# Patient Record
Sex: FEMALE | Race: WHITE | NOT HISPANIC OR LATINO
[De-identification: names, ages, dates, MRNs, and addresses within clinical notes are randomized per-mention and may not be internally consistent; named-entity substitution may affect disease eponyms.]

---

## 2017-04-25 ENCOUNTER — RESULT REVIEW (OUTPATIENT)
Age: 52
End: 2017-04-25

## 2017-06-07 ENCOUNTER — RESULT REVIEW (OUTPATIENT)
Age: 52
End: 2017-06-07

## 2017-08-31 ENCOUNTER — APPOINTMENT (OUTPATIENT)
Dept: MAMMOGRAPHY | Facility: CLINIC | Age: 52
End: 2017-08-31

## 2017-08-31 ENCOUNTER — OUTPATIENT (OUTPATIENT)
Dept: OUTPATIENT SERVICES | Facility: HOSPITAL | Age: 52
LOS: 1 days | End: 2017-08-31
Payer: COMMERCIAL

## 2017-08-31 ENCOUNTER — APPOINTMENT (OUTPATIENT)
Dept: ULTRASOUND IMAGING | Facility: CLINIC | Age: 52
End: 2017-08-31

## 2017-08-31 DIAGNOSIS — Z00.8 ENCOUNTER FOR OTHER GENERAL EXAMINATION: ICD-10-CM

## 2017-08-31 PROCEDURE — 77063 BREAST TOMOSYNTHESIS BI: CPT | Mod: 26

## 2017-08-31 PROCEDURE — 77067 SCR MAMMO BI INCL CAD: CPT

## 2017-08-31 PROCEDURE — 77063 BREAST TOMOSYNTHESIS BI: CPT

## 2017-08-31 PROCEDURE — 76641 ULTRASOUND BREAST COMPLETE: CPT

## 2017-08-31 PROCEDURE — 76641 ULTRASOUND BREAST COMPLETE: CPT | Mod: 26,50

## 2017-08-31 PROCEDURE — G0202: CPT | Mod: 26

## 2017-09-05 ENCOUNTER — OUTPATIENT (OUTPATIENT)
Dept: OUTPATIENT SERVICES | Facility: HOSPITAL | Age: 52
LOS: 1 days | End: 2017-09-05
Payer: COMMERCIAL

## 2017-09-05 ENCOUNTER — APPOINTMENT (OUTPATIENT)
Dept: ULTRASOUND IMAGING | Facility: CLINIC | Age: 52
End: 2017-09-05

## 2017-09-05 DIAGNOSIS — Z00.8 ENCOUNTER FOR OTHER GENERAL EXAMINATION: ICD-10-CM

## 2017-09-05 PROCEDURE — 76642 ULTRASOUND BREAST LIMITED: CPT

## 2017-09-05 PROCEDURE — 76642 ULTRASOUND BREAST LIMITED: CPT | Mod: 26,RT

## 2017-09-07 ENCOUNTER — OUTPATIENT (OUTPATIENT)
Dept: OUTPATIENT SERVICES | Facility: HOSPITAL | Age: 52
LOS: 1 days | End: 2017-09-07
Payer: COMMERCIAL

## 2017-09-07 ENCOUNTER — RESULT REVIEW (OUTPATIENT)
Age: 52
End: 2017-09-07

## 2017-09-07 ENCOUNTER — APPOINTMENT (OUTPATIENT)
Dept: ULTRASOUND IMAGING | Facility: CLINIC | Age: 52
End: 2017-09-07

## 2017-09-07 DIAGNOSIS — Z00.8 ENCOUNTER FOR OTHER GENERAL EXAMINATION: ICD-10-CM

## 2017-09-07 PROCEDURE — 77065 DX MAMMO INCL CAD UNI: CPT

## 2017-09-07 PROCEDURE — 19083 BX BREAST 1ST LESION US IMAG: CPT | Mod: RT

## 2017-09-07 PROCEDURE — G0206: CPT | Mod: 26,RT

## 2017-09-07 PROCEDURE — 19083 BX BREAST 1ST LESION US IMAG: CPT

## 2017-09-11 DIAGNOSIS — N63 UNSPECIFIED LUMP IN BREAST: ICD-10-CM

## 2017-09-11 DIAGNOSIS — R92.8 OTHER ABNORMAL AND INCONCLUSIVE FINDINGS ON DIAGNOSTIC IMAGING OF BREAST: ICD-10-CM

## 2017-10-04 ENCOUNTER — OUTPATIENT (OUTPATIENT)
Dept: OUTPATIENT SERVICES | Facility: HOSPITAL | Age: 52
LOS: 1 days | End: 2017-10-04
Payer: COMMERCIAL

## 2017-10-04 ENCOUNTER — APPOINTMENT (OUTPATIENT)
Dept: ULTRASOUND IMAGING | Facility: IMAGING CENTER | Age: 52
End: 2017-10-04
Payer: COMMERCIAL

## 2017-10-04 DIAGNOSIS — N60.81 OTHER BENIGN MAMMARY DYSPLASIAS OF RIGHT BREAST: ICD-10-CM

## 2017-10-05 PROCEDURE — 19285 PERQ DEV BREAST 1ST US IMAG: CPT

## 2017-10-05 PROCEDURE — 19285 PERQ DEV BREAST 1ST US IMAG: CPT | Mod: RT

## 2017-10-05 PROCEDURE — C1739: CPT

## 2017-10-10 ENCOUNTER — OUTPATIENT (OUTPATIENT)
Dept: OUTPATIENT SERVICES | Facility: HOSPITAL | Age: 52
LOS: 1 days | End: 2017-10-10
Payer: COMMERCIAL

## 2017-10-10 VITALS
RESPIRATION RATE: 18 BRPM | DIASTOLIC BLOOD PRESSURE: 80 MMHG | OXYGEN SATURATION: 98 % | WEIGHT: 151.9 LBS | HEIGHT: 63 IN | HEART RATE: 71 BPM | SYSTOLIC BLOOD PRESSURE: 130 MMHG | TEMPERATURE: 98 F

## 2017-10-10 DIAGNOSIS — Z01.818 ENCOUNTER FOR OTHER PREPROCEDURAL EXAMINATION: ICD-10-CM

## 2017-10-10 DIAGNOSIS — N63.0 UNSPECIFIED LUMP IN UNSPECIFIED BREAST: ICD-10-CM

## 2017-10-10 DIAGNOSIS — N60.81 OTHER BENIGN MAMMARY DYSPLASIAS OF RIGHT BREAST: ICD-10-CM

## 2017-10-10 DIAGNOSIS — E07.9 DISORDER OF THYROID, UNSPECIFIED: Chronic | ICD-10-CM

## 2017-10-10 DIAGNOSIS — D36.10 BENIGN NEOPLASM OF PERIPHERAL NERVES AND AUTONOMIC NERVOUS SYSTEM, UNSPECIFIED: Chronic | ICD-10-CM

## 2017-10-10 DIAGNOSIS — K21.9 GASTRO-ESOPHAGEAL REFLUX DISEASE WITHOUT ESOPHAGITIS: ICD-10-CM

## 2017-10-10 LAB
HCT VFR BLD CALC: 38.6 % — SIGNIFICANT CHANGE UP (ref 34.5–45)
HGB BLD-MCNC: 12.6 G/DL — SIGNIFICANT CHANGE UP (ref 11.5–15.5)
MCHC RBC-ENTMCNC: 30.2 PG — SIGNIFICANT CHANGE UP (ref 27–34)
MCHC RBC-ENTMCNC: 32.6 GM/DL — SIGNIFICANT CHANGE UP (ref 32–36)
MCV RBC AUTO: 92.6 FL — SIGNIFICANT CHANGE UP (ref 80–100)
PLATELET # BLD AUTO: 264 K/UL — SIGNIFICANT CHANGE UP (ref 150–400)
RBC # BLD: 4.17 M/UL — SIGNIFICANT CHANGE UP (ref 3.8–5.2)
RBC # FLD: 13 % — SIGNIFICANT CHANGE UP (ref 10.3–14.5)
WBC # BLD: 9.11 K/UL — SIGNIFICANT CHANGE UP (ref 3.8–10.5)
WBC # FLD AUTO: 9.11 K/UL — SIGNIFICANT CHANGE UP (ref 3.8–10.5)

## 2017-10-10 PROCEDURE — G0463: CPT

## 2017-10-10 PROCEDURE — 85027 COMPLETE CBC AUTOMATED: CPT

## 2017-10-10 RX ORDER — LIDOCAINE HCL 20 MG/ML
0.2 VIAL (ML) INJECTION ONCE
Qty: 0 | Refills: 0 | Status: DISCONTINUED | OUTPATIENT
Start: 2017-10-11 | End: 2017-10-26

## 2017-10-10 RX ORDER — ACETAMINOPHEN 500 MG
975 TABLET ORAL ONCE
Qty: 0 | Refills: 0 | Status: COMPLETED | OUTPATIENT
Start: 2017-10-11 | End: 2017-10-11

## 2017-10-10 RX ORDER — SODIUM CHLORIDE 9 MG/ML
3 INJECTION INTRAMUSCULAR; INTRAVENOUS; SUBCUTANEOUS EVERY 8 HOURS
Qty: 0 | Refills: 0 | Status: DISCONTINUED | OUTPATIENT
Start: 2017-10-11 | End: 2017-10-26

## 2017-10-10 NOTE — H&P PST ADULT - ATTENDING COMMENTS
The patient is a 52 year old female who recently underwent a right 11:00 ultrasound core biopsy with pathology revealing atypia.  She now presents for a right breast surgical excisional biopsy.

## 2017-10-10 NOTE — H&P PST ADULT - NSANTHOSAYNRD_GEN_A_CORE
No. MAYDSON screening performed.  STOP BANG Legend: 0-2 = LOW Risk; 3-4 = INTERMEDIATE Risk; 5-8 = HIGH Risk

## 2017-10-10 NOTE — H&P PST ADULT - HISTORY OF PRESENT ILLNESS
This is a 51 y/o female upon a routine mammogram revealed right breast mass, she presents today for surgery

## 2017-10-11 ENCOUNTER — RESULT REVIEW (OUTPATIENT)
Age: 52
End: 2017-10-11

## 2017-10-11 ENCOUNTER — OUTPATIENT (OUTPATIENT)
Dept: OUTPATIENT SERVICES | Facility: HOSPITAL | Age: 52
LOS: 1 days | End: 2017-10-11
Payer: COMMERCIAL

## 2017-10-11 ENCOUNTER — TRANSCRIPTION ENCOUNTER (OUTPATIENT)
Age: 52
End: 2017-10-11

## 2017-10-11 VITALS
SYSTOLIC BLOOD PRESSURE: 100 MMHG | RESPIRATION RATE: 16 BRPM | OXYGEN SATURATION: 100 % | DIASTOLIC BLOOD PRESSURE: 60 MMHG | HEART RATE: 59 BPM | TEMPERATURE: 98 F

## 2017-10-11 VITALS
RESPIRATION RATE: 16 BRPM | HEART RATE: 59 BPM | DIASTOLIC BLOOD PRESSURE: 66 MMHG | WEIGHT: 151.9 LBS | SYSTOLIC BLOOD PRESSURE: 95 MMHG | OXYGEN SATURATION: 98 % | TEMPERATURE: 98 F | HEIGHT: 63 IN

## 2017-10-11 DIAGNOSIS — D36.10 BENIGN NEOPLASM OF PERIPHERAL NERVES AND AUTONOMIC NERVOUS SYSTEM, UNSPECIFIED: Chronic | ICD-10-CM

## 2017-10-11 DIAGNOSIS — N60.81 OTHER BENIGN MAMMARY DYSPLASIAS OF RIGHT BREAST: ICD-10-CM

## 2017-10-11 DIAGNOSIS — E07.9 DISORDER OF THYROID, UNSPECIFIED: Chronic | ICD-10-CM

## 2017-10-11 PROCEDURE — 76098 X-RAY EXAM SURGICAL SPECIMEN: CPT | Mod: 26

## 2017-10-11 PROCEDURE — 88307 TISSUE EXAM BY PATHOLOGIST: CPT | Mod: 26

## 2017-10-11 PROCEDURE — 88360 TUMOR IMMUNOHISTOCHEM/MANUAL: CPT | Mod: 26

## 2017-10-11 PROCEDURE — 19125 EXCISION BREAST LESION: CPT | Mod: RT

## 2017-10-11 PROCEDURE — 88307 TISSUE EXAM BY PATHOLOGIST: CPT

## 2017-10-11 PROCEDURE — 76098 X-RAY EXAM SURGICAL SPECIMEN: CPT

## 2017-10-11 PROCEDURE — 88360 TUMOR IMMUNOHISTOCHEM/MANUAL: CPT

## 2017-10-11 RX ORDER — OXYCODONE HYDROCHLORIDE 5 MG/1
10 TABLET ORAL ONCE
Qty: 0 | Refills: 0 | Status: DISCONTINUED | OUTPATIENT
Start: 2017-10-11 | End: 2017-10-11

## 2017-10-11 RX ORDER — OXYCODONE HYDROCHLORIDE 5 MG/1
5 TABLET ORAL ONCE
Qty: 0 | Refills: 0 | Status: DISCONTINUED | OUTPATIENT
Start: 2017-10-11 | End: 2017-10-11

## 2017-10-11 RX ORDER — FAMOTIDINE 10 MG/ML
20 INJECTION INTRAVENOUS ONCE
Qty: 0 | Refills: 0 | Status: COMPLETED | OUTPATIENT
Start: 2017-10-11 | End: 2017-10-11

## 2017-10-11 RX ORDER — CELECOXIB 200 MG/1
200 CAPSULE ORAL ONCE
Qty: 0 | Refills: 0 | Status: DISCONTINUED | OUTPATIENT
Start: 2017-10-11 | End: 2017-10-26

## 2017-10-11 RX ORDER — CELECOXIB 200 MG/1
200 CAPSULE ORAL ONCE
Qty: 0 | Refills: 0 | Status: COMPLETED | OUTPATIENT
Start: 2017-10-11 | End: 2017-10-11

## 2017-10-11 RX ORDER — ONDANSETRON 8 MG/1
4 TABLET, FILM COATED ORAL ONCE
Qty: 0 | Refills: 0 | Status: DISCONTINUED | OUTPATIENT
Start: 2017-10-11 | End: 2017-10-26

## 2017-10-11 RX ORDER — SODIUM CHLORIDE 9 MG/ML
1000 INJECTION INTRAMUSCULAR; INTRAVENOUS; SUBCUTANEOUS
Qty: 0 | Refills: 0 | Status: DISCONTINUED | OUTPATIENT
Start: 2017-10-11 | End: 2017-10-26

## 2017-10-11 RX ADMIN — CELECOXIB 200 MILLIGRAM(S): 200 CAPSULE ORAL at 10:28

## 2017-10-11 RX ADMIN — Medication 975 MILLIGRAM(S): at 10:28

## 2017-10-11 RX ADMIN — FAMOTIDINE 20 MILLIGRAM(S): 10 INJECTION INTRAVENOUS at 11:14

## 2017-10-11 NOTE — ASU DISCHARGE PLAN (ADULT/PEDIATRIC). - FOLLOWUP APPOINTMENT CLINIC/PHYSICIAN
Palleschi, Susan M (MD), Surgery  [address] [place] [address]  [address]  [place] [address]  Phone: [phone]

## 2017-10-11 NOTE — PRE-ANESTHESIA EVALUATION ADULT - NSANTHOSAYNRD_GEN_A_CORE
No. MADYSON screening performed.  STOP BANG Legend: 0-2 = LOW Risk; 3-4 = INTERMEDIATE Risk; 5-8 = HIGH Risk

## 2017-10-11 NOTE — ASU DISCHARGE PLAN (ADULT/PEDIATRIC). - SPECIAL INSTRUCTIONS
Activity: Resume activities of daily living. You may shower tomorrow, just let the water run over the wound, no scrubbing. No immersion baths or swimming in pools or ocean until you see us in the office again. You may remove the clear dressing tomorrow, but please leave the steri-strips (small white bandaids) on. These will remain on and fall off by themselves in the next few weeks.  Diet: Resume your regular diet  Follow up: Please make an appointment with Dr. Palleschi in 1-2 weeks. Please find contact information on this page.

## 2017-10-11 NOTE — ASU DISCHARGE PLAN (ADULT/PEDIATRIC). - ASU FOLLOWUP
[facility] ([facility]): 911 or go to the nearest Emergency Room

## 2017-10-11 NOTE — ASU DISCHARGE PLAN (ADULT/PEDIATRIC). - NOTIFY
Numbness, tingling/Pain not relieved by Medications/Bleeding that does not stop/Persistent Nausea and Vomiting/Swelling that continues/Fever greater than 101

## 2017-10-11 NOTE — ASU DISCHARGE PLAN (ADULT/PEDIATRIC). - MEDICATION SUMMARY - MEDICATIONS TO TAKE
I will START or STAY ON the medications listed below when I get home from the hospital:    CoQ10 300 mg oral capsule  -- 1 cap(s) by mouth once a day  -- Indication: For continue taking as previously indicated    PriLOSEC 40 mg oral delayed release capsule  -- 1 cap(s) by mouth once a day  -- Indication: For continue taking as previously indicated    Centrum oral tablet, chewable  -- 1 tab(s) by mouth once a day  -- Indication: For continue taking as previously indicated    Vitamin B12 100 mcg oral tablet  -- 1 tab(s) by mouth once a day  -- Indication: For continue taking as previously indicated

## 2017-10-11 NOTE — BRIEF OPERATIVE NOTE - PROCEDURE
<<-----Click on this checkbox to enter Procedure Breast biopsy, right  10/11/2017  with mikel  localization  Active  IRENE

## 2017-10-13 LAB — SURGICAL PATHOLOGY STUDY: SIGNIFICANT CHANGE UP

## 2017-10-23 ENCOUNTER — OUTPATIENT (OUTPATIENT)
Dept: OUTPATIENT SERVICES | Facility: HOSPITAL | Age: 52
LOS: 1 days | End: 2017-10-23
Payer: COMMERCIAL

## 2017-10-23 ENCOUNTER — APPOINTMENT (OUTPATIENT)
Dept: MRI IMAGING | Facility: CLINIC | Age: 52
End: 2017-10-23

## 2017-10-23 DIAGNOSIS — D36.10 BENIGN NEOPLASM OF PERIPHERAL NERVES AND AUTONOMIC NERVOUS SYSTEM, UNSPECIFIED: Chronic | ICD-10-CM

## 2017-10-23 DIAGNOSIS — Z00.8 ENCOUNTER FOR OTHER GENERAL EXAMINATION: ICD-10-CM

## 2017-10-23 DIAGNOSIS — E07.9 DISORDER OF THYROID, UNSPECIFIED: Chronic | ICD-10-CM

## 2017-10-23 PROCEDURE — A9585: CPT

## 2017-10-23 PROCEDURE — 0159T: CPT | Mod: 26

## 2017-10-23 PROCEDURE — 77059 MRI BREAST BILATERAL: CPT | Mod: 26

## 2017-10-23 PROCEDURE — C8908: CPT

## 2017-10-23 PROCEDURE — C8937: CPT

## 2017-10-27 ENCOUNTER — RESULT REVIEW (OUTPATIENT)
Age: 52
End: 2017-10-27

## 2017-10-27 ENCOUNTER — OUTPATIENT (OUTPATIENT)
Dept: OUTPATIENT SERVICES | Facility: HOSPITAL | Age: 52
LOS: 1 days | End: 2017-10-27
Payer: COMMERCIAL

## 2017-10-27 ENCOUNTER — APPOINTMENT (OUTPATIENT)
Dept: ULTRASOUND IMAGING | Facility: CLINIC | Age: 52
End: 2017-10-27
Payer: COMMERCIAL

## 2017-10-27 ENCOUNTER — APPOINTMENT (OUTPATIENT)
Dept: MRI IMAGING | Facility: CLINIC | Age: 52
End: 2017-10-27
Payer: COMMERCIAL

## 2017-10-27 DIAGNOSIS — D36.10 BENIGN NEOPLASM OF PERIPHERAL NERVES AND AUTONOMIC NERVOUS SYSTEM, UNSPECIFIED: Chronic | ICD-10-CM

## 2017-10-27 DIAGNOSIS — D05.11 INTRADUCTAL CARCINOMA IN SITU OF RIGHT BREAST: ICD-10-CM

## 2017-10-27 DIAGNOSIS — E07.9 DISORDER OF THYROID, UNSPECIFIED: Chronic | ICD-10-CM

## 2017-10-27 PROCEDURE — A4648: CPT

## 2017-10-27 PROCEDURE — 19083 BX BREAST 1ST LESION US IMAG: CPT | Mod: RT

## 2017-10-27 PROCEDURE — 19083 BX BREAST 1ST LESION US IMAG: CPT

## 2017-10-27 PROCEDURE — A9585: CPT

## 2017-10-27 PROCEDURE — 19085 BX BREAST 1ST LESION MR IMAG: CPT | Mod: RT

## 2017-10-27 PROCEDURE — G0206: CPT | Mod: 26,RT

## 2017-10-27 PROCEDURE — 77065 DX MAMMO INCL CAD UNI: CPT

## 2017-10-27 PROCEDURE — 82565 ASSAY OF CREATININE: CPT

## 2017-10-27 PROCEDURE — 19085 BX BREAST 1ST LESION MR IMAG: CPT

## 2017-10-30 ENCOUNTER — APPOINTMENT (OUTPATIENT)
Dept: PLASTIC SURGERY | Facility: CLINIC | Age: 52
End: 2017-10-30
Payer: COMMERCIAL

## 2017-10-30 VITALS
HEART RATE: 60 BPM | HEIGHT: 63 IN | DIASTOLIC BLOOD PRESSURE: 65 MMHG | SYSTOLIC BLOOD PRESSURE: 97 MMHG | TEMPERATURE: 97.8 F | WEIGHT: 147 LBS | BODY MASS INDEX: 26.05 KG/M2

## 2017-10-30 VITALS
HEART RATE: 60 BPM | RESPIRATION RATE: 16 BRPM | DIASTOLIC BLOOD PRESSURE: 70 MMHG | TEMPERATURE: 97.8 F | OXYGEN SATURATION: 100 % | WEIGHT: 147 LBS | HEIGHT: 63 IN | BODY MASS INDEX: 26.05 KG/M2 | SYSTOLIC BLOOD PRESSURE: 100 MMHG

## 2017-10-30 DIAGNOSIS — Z86.39 PERSONAL HISTORY OF OTHER ENDOCRINE, NUTRITIONAL AND METABOLIC DISEASE: ICD-10-CM

## 2017-10-30 DIAGNOSIS — Z78.9 OTHER SPECIFIED HEALTH STATUS: ICD-10-CM

## 2017-10-30 DIAGNOSIS — Z87.891 PERSONAL HISTORY OF NICOTINE DEPENDENCE: ICD-10-CM

## 2017-10-30 DIAGNOSIS — Z80.3 FAMILY HISTORY OF MALIGNANT NEOPLASM OF BREAST: ICD-10-CM

## 2017-10-30 DIAGNOSIS — Z83.3 FAMILY HISTORY OF DIABETES MELLITUS: ICD-10-CM

## 2017-10-30 DIAGNOSIS — Z82.49 FAMILY HISTORY OF ISCHEMIC HEART DISEASE AND OTHER DISEASES OF THE CIRCULATORY SYSTEM: ICD-10-CM

## 2017-10-30 DIAGNOSIS — Z82.5 FAMILY HISTORY OF ASTHMA AND OTHER CHRONIC LOWER RESPIRATORY DISEASES: ICD-10-CM

## 2017-10-30 PROCEDURE — 99354: CPT

## 2017-10-30 PROCEDURE — 99214 OFFICE O/P EST MOD 30 MIN: CPT

## 2017-10-30 PROCEDURE — 99215 OFFICE O/P EST HI 40 MIN: CPT

## 2017-10-31 DIAGNOSIS — R91.8 OTHER NONSPECIFIC ABNORMAL FINDING OF LUNG FIELD: ICD-10-CM

## 2017-10-31 DIAGNOSIS — R59.0 LOCALIZED ENLARGED LYMPH NODES: ICD-10-CM

## 2017-10-31 DIAGNOSIS — N64.89 OTHER SPECIFIED DISORDERS OF BREAST: ICD-10-CM

## 2017-11-06 ENCOUNTER — APPOINTMENT (OUTPATIENT)
Dept: PLASTIC SURGERY | Facility: CLINIC | Age: 52
End: 2017-11-06
Payer: COMMERCIAL

## 2017-11-06 PROCEDURE — 99214 OFFICE O/P EST MOD 30 MIN: CPT

## 2017-11-07 ENCOUNTER — FORM ENCOUNTER (OUTPATIENT)
Age: 52
End: 2017-11-07

## 2017-11-08 ENCOUNTER — OUTPATIENT (OUTPATIENT)
Dept: OUTPATIENT SERVICES | Facility: HOSPITAL | Age: 52
LOS: 1 days | End: 2017-11-08
Payer: COMMERCIAL

## 2017-11-08 VITALS
TEMPERATURE: 98 F | RESPIRATION RATE: 15 BRPM | SYSTOLIC BLOOD PRESSURE: 112 MMHG | HEART RATE: 76 BPM | OXYGEN SATURATION: 99 % | WEIGHT: 149.03 LBS | HEIGHT: 63 IN | DIASTOLIC BLOOD PRESSURE: 68 MMHG

## 2017-11-08 DIAGNOSIS — Z98.890 OTHER SPECIFIED POSTPROCEDURAL STATES: Chronic | ICD-10-CM

## 2017-11-08 DIAGNOSIS — Z80.3 FAMILY HISTORY OF MALIGNANT NEOPLASM OF BREAST: ICD-10-CM

## 2017-11-08 DIAGNOSIS — E07.9 DISORDER OF THYROID, UNSPECIFIED: Chronic | ICD-10-CM

## 2017-11-08 DIAGNOSIS — C50.919 MALIGNANT NEOPLASM OF UNSPECIFIED SITE OF UNSPECIFIED FEMALE BREAST: ICD-10-CM

## 2017-11-08 DIAGNOSIS — D36.10 BENIGN NEOPLASM OF PERIPHERAL NERVES AND AUTONOMIC NERVOUS SYSTEM, UNSPECIFIED: Chronic | ICD-10-CM

## 2017-11-08 LAB
ALBUMIN SERPL ELPH-MCNC: 4.5 G/DL — SIGNIFICANT CHANGE UP (ref 3.3–5)
ALP SERPL-CCNC: 65 U/L — SIGNIFICANT CHANGE UP (ref 40–120)
ALT FLD-CCNC: 21 U/L — SIGNIFICANT CHANGE UP (ref 4–33)
AST SERPL-CCNC: 22 U/L — SIGNIFICANT CHANGE UP (ref 4–32)
BILIRUB SERPL-MCNC: 0.3 MG/DL — SIGNIFICANT CHANGE UP (ref 0.2–1.2)
BLD GP AB SCN SERPL QL: NEGATIVE — SIGNIFICANT CHANGE UP
BUN SERPL-MCNC: 16 MG/DL — SIGNIFICANT CHANGE UP (ref 7–23)
CALCIUM SERPL-MCNC: 9.7 MG/DL — SIGNIFICANT CHANGE UP (ref 8.4–10.5)
CHLORIDE SERPL-SCNC: 100 MMOL/L — SIGNIFICANT CHANGE UP (ref 98–107)
CO2 SERPL-SCNC: 28 MMOL/L — SIGNIFICANT CHANGE UP (ref 22–31)
CREAT SERPL-MCNC: 0.82 MG/DL — SIGNIFICANT CHANGE UP (ref 0.5–1.3)
GLUCOSE SERPL-MCNC: 81 MG/DL — SIGNIFICANT CHANGE UP (ref 70–99)
HCT VFR BLD CALC: 39.1 % — SIGNIFICANT CHANGE UP (ref 34.5–45)
HGB BLD-MCNC: 12.9 G/DL — SIGNIFICANT CHANGE UP (ref 11.5–15.5)
MCHC RBC-ENTMCNC: 30 PG — SIGNIFICANT CHANGE UP (ref 27–34)
MCHC RBC-ENTMCNC: 33 % — SIGNIFICANT CHANGE UP (ref 32–36)
MCV RBC AUTO: 90.9 FL — SIGNIFICANT CHANGE UP (ref 80–100)
NRBC # FLD: 0 — SIGNIFICANT CHANGE UP
PLATELET # BLD AUTO: 294 K/UL — SIGNIFICANT CHANGE UP (ref 150–400)
PMV BLD: 9.2 FL — SIGNIFICANT CHANGE UP (ref 7–13)
POTASSIUM SERPL-MCNC: 4.5 MMOL/L — SIGNIFICANT CHANGE UP (ref 3.5–5.3)
POTASSIUM SERPL-SCNC: 4.5 MMOL/L — SIGNIFICANT CHANGE UP (ref 3.5–5.3)
PROT SERPL-MCNC: 7.7 G/DL — SIGNIFICANT CHANGE UP (ref 6–8.3)
RBC # BLD: 4.3 M/UL — SIGNIFICANT CHANGE UP (ref 3.8–5.2)
RBC # FLD: 12 % — SIGNIFICANT CHANGE UP (ref 10.3–14.5)
RH IG SCN BLD-IMP: POSITIVE — SIGNIFICANT CHANGE UP
SODIUM SERPL-SCNC: 140 MMOL/L — SIGNIFICANT CHANGE UP (ref 135–145)
WBC # BLD: 7.27 K/UL — SIGNIFICANT CHANGE UP (ref 3.8–10.5)
WBC # FLD AUTO: 7.27 K/UL — SIGNIFICANT CHANGE UP (ref 3.8–10.5)

## 2017-11-08 PROCEDURE — 71020: CPT | Mod: 26

## 2017-11-08 RX ORDER — MULTIVIT-MIN/FERROUS GLUCONATE 9 MG/15 ML
1 LIQUID (ML) ORAL
Qty: 0 | Refills: 0 | COMMUNITY

## 2017-11-08 RX ORDER — PREGABALIN 225 MG/1
1 CAPSULE ORAL
Qty: 0 | Refills: 0 | COMMUNITY

## 2017-11-08 RX ORDER — UBIDECARENONE 100 MG
1 CAPSULE ORAL
Qty: 0 | Refills: 0 | COMMUNITY

## 2017-11-08 RX ORDER — OMEPRAZOLE 10 MG/1
1 CAPSULE, DELAYED RELEASE ORAL
Qty: 0 | Refills: 0 | COMMUNITY

## 2017-11-08 NOTE — H&P PST ADULT - HISTORY OF PRESENT ILLNESS
53 y/o female with right breast malignant neoplasm discovered on recent imaging. Pt scheduled for Bilateral total mastectomies, right sentinel lymph node biopsy, possible  lymph node dissection 1/14/17. 53 y/o female with right breast malignant neoplasm discovered on recent imaging. Pt scheduled for Bilateral total mastectomies, right sentinel lymph node biopsy, possible  lymph node dissection 11/14/17. 53 y/o female with right breast malignant neoplasm discovered on recent imaging. Pt scheduled for Bilateral total mastectomies, right sentinel lymph node biopsy, possible  lymph node dissection, Bilateral Breast Reconstruction with Deep Inferior Epigastric Artery  Flaps  11/14/17. 53 y/o female with right breast malignant neoplasm discovered on recent imaging. Pt scheduled for Bilateral total mastectomies, bilateral sentinel lymph node biopsy, possible  lymph node dissection, Bilateral Breast Reconstruction with Deep Inferior Epigastric Artery  Flaps  11/14/17.

## 2017-11-08 NOTE — H&P PST ADULT - FAMILY HISTORY
Mother  Still living? No  Family history of diabetes mellitus, Age at diagnosis: Age Unknown     Father  Still living? No  Family history of heart disease, Age at diagnosis: Age Unknown

## 2017-11-08 NOTE — H&P PST ADULT - PSH
Benign neuroma  neuroma removal from left hand  Disorder of thyroid  s/p thyroid nodule removal Benign neuroma  neuroma removal from left hand  Disorder of thyroid  s/p thyroid nodule removal  H/O breast biopsy  10/11/17 right breast

## 2017-11-08 NOTE — H&P PST ADULT - SKIN/BREAST COMMENTS
right breast malignant neoplasm discovered on recent imaging. Pt scheduled for Bilateral total mastectomies, right sentinel lymph node biopsy, possible  lymph node dissection 1/14/17. Right breast malignant neoplasm discovered on recent imaging. Pt scheduled for Bilateral total mastectomies, right sentinel lymph node biopsy, possible  lymph node dissection, Bilateral Breast Reconstruction with Deep Inferior Epigastric Artery  Flaps  11/14/17.

## 2017-11-08 NOTE — H&P PST ADULT - PMH
GERD (gastroesophageal reflux disease) GERD (gastroesophageal reflux disease)    Malignant neoplasm of breast

## 2017-11-08 NOTE — H&P PST ADULT - ASSESSMENT
51 y/o female with right breast malignant neoplasm discovered on recent imaging. Pt scheduled for Bilateral total mastectomies, right sentinel lymph node biopsy, possible  lymph node dissection 11/14/17. 53 y/o female with right breast malignant neoplasm discovered on recent imaging. Pt scheduled for Bilateral total mastectomies, right sentinel lymph node biopsy, possible  lymph node dissection, Bilateral Breast Reconstruction with Deep Inferior Epigastric Artery  Flaps  11/14/17. 51 y/o female with right breast malignant neoplasm discovered on recent imaging. Pt scheduled for Bilateral total mastectomies, bilateral sentinel lymph node biopsy, possible  lymph node dissection, Bilateral Breast Reconstruction with Deep Inferior Epigastric Artery  Flaps  11/14/17.

## 2017-11-08 NOTE — H&P PST ADULT - ATTENDING COMMENTS
52 year old female recently diagnosed with right breast ductal carcinoma in situ on a surgical excisional biopsy for atypia,  She has opted to undergo bilateral total mastectomies, bilateral axillary sentinel lymph node biopsies, possible bilateral axillary lymph node dissections and reconstruction.

## 2017-11-08 NOTE — H&P PST ADULT - PROBLEM SELECTOR PLAN 1
Bilateral total mastectomies, right sentinel lymph node biopsy, possible  lymph node dissection 11/14/17.     CBC CMP T&S CXR    antibacterial soap given and explained. Pre-op instructions given and explained Bilateral total mastectomies, right sentinel lymph node biopsy, possible  lymph node dissection, Bilateral Breast Reconstruction with Deep Inferior Epigastric Artery  Flaps  11/14/17.      CBC CMP T&S CXR    antibacterial soap given and explained. Pre-op instructions given and explained Bilateral total mastectomies, bilateral sentinel lymph node biopsy, possible  lymph node dissection, Bilateral Breast Reconstruction with Deep Inferior Epigastric Artery  Flaps  11/14/17.      CBC CMP T&S CXR    antibacterial soap given and explained. Pre-op instructions given and explained

## 2017-11-09 ENCOUNTER — FORM ENCOUNTER (OUTPATIENT)
Age: 52
End: 2017-11-09

## 2017-11-10 ENCOUNTER — APPOINTMENT (OUTPATIENT)
Dept: CT IMAGING | Facility: IMAGING CENTER | Age: 52
End: 2017-11-10
Payer: COMMERCIAL

## 2017-11-10 ENCOUNTER — RX RENEWAL (OUTPATIENT)
Age: 52
End: 2017-11-10

## 2017-11-10 ENCOUNTER — OUTPATIENT (OUTPATIENT)
Dept: OUTPATIENT SERVICES | Facility: HOSPITAL | Age: 52
LOS: 1 days | End: 2017-11-10
Payer: COMMERCIAL

## 2017-11-10 DIAGNOSIS — Z98.890 OTHER SPECIFIED POSTPROCEDURAL STATES: Chronic | ICD-10-CM

## 2017-11-10 DIAGNOSIS — E07.9 DISORDER OF THYROID, UNSPECIFIED: Chronic | ICD-10-CM

## 2017-11-10 DIAGNOSIS — Z00.8 ENCOUNTER FOR OTHER GENERAL EXAMINATION: ICD-10-CM

## 2017-11-10 DIAGNOSIS — D36.10 BENIGN NEOPLASM OF PERIPHERAL NERVES AND AUTONOMIC NERVOUS SYSTEM, UNSPECIFIED: Chronic | ICD-10-CM

## 2017-11-10 PROCEDURE — 74174 CTA ABD&PLVS W/CONTRAST: CPT | Mod: 26

## 2017-11-10 PROCEDURE — 74174 CTA ABD&PLVS W/CONTRAST: CPT

## 2017-11-10 RX ORDER — ASPIRIN 81 MG/1
81 TABLET ORAL
Qty: 21 | Refills: 0 | Status: ACTIVE | COMMUNITY
Start: 2017-11-10 | End: 1900-01-01

## 2017-11-10 RX ORDER — OXYCODONE AND ACETAMINOPHEN 5; 325 MG/1; MG/1
5-325 TABLET ORAL
Qty: 30 | Refills: 0 | Status: ACTIVE | COMMUNITY
Start: 2017-11-10 | End: 1900-01-01

## 2017-11-13 ENCOUNTER — APPOINTMENT (OUTPATIENT)
Dept: MRI IMAGING | Facility: IMAGING CENTER | Age: 52
End: 2017-11-13

## 2017-11-13 NOTE — ASU PATIENT PROFILE, ADULT - PSH
Benign neuroma  neuroma removal from left hand  Disorder of thyroid  s/p thyroid nodule removal  H/O breast biopsy  10/11/17 right breast

## 2017-11-14 ENCOUNTER — TRANSCRIPTION ENCOUNTER (OUTPATIENT)
Age: 52
End: 2017-11-14

## 2017-11-14 ENCOUNTER — APPOINTMENT (OUTPATIENT)
Dept: NUCLEAR MEDICINE | Facility: HOSPITAL | Age: 52
End: 2017-11-14

## 2017-11-14 ENCOUNTER — RESULT REVIEW (OUTPATIENT)
Age: 52
End: 2017-11-14

## 2017-11-14 ENCOUNTER — INPATIENT (INPATIENT)
Facility: HOSPITAL | Age: 52
LOS: 3 days | Discharge: HOME | End: 2017-11-18
Attending: PLASTIC SURGERY | Admitting: PLASTIC SURGERY
Payer: COMMERCIAL

## 2017-11-14 ENCOUNTER — APPOINTMENT (OUTPATIENT)
Dept: PLASTIC SURGERY | Facility: HOSPITAL | Age: 52
End: 2017-11-14

## 2017-11-14 VITALS
TEMPERATURE: 98 F | HEIGHT: 63 IN | HEART RATE: 68 BPM | RESPIRATION RATE: 16 BRPM | DIASTOLIC BLOOD PRESSURE: 74 MMHG | WEIGHT: 149.03 LBS | SYSTOLIC BLOOD PRESSURE: 96 MMHG | OXYGEN SATURATION: 97 %

## 2017-11-14 DIAGNOSIS — Z98.890 OTHER SPECIFIED POSTPROCEDURAL STATES: Chronic | ICD-10-CM

## 2017-11-14 DIAGNOSIS — E07.9 DISORDER OF THYROID, UNSPECIFIED: Chronic | ICD-10-CM

## 2017-11-14 DIAGNOSIS — Z80.3 FAMILY HISTORY OF MALIGNANT NEOPLASM OF BREAST: ICD-10-CM

## 2017-11-14 DIAGNOSIS — D36.10 BENIGN NEOPLASM OF PERIPHERAL NERVES AND AUTONOMIC NERVOUS SYSTEM, UNSPECIFIED: Chronic | ICD-10-CM

## 2017-11-14 LAB
HCG UR QL: NEGATIVE — SIGNIFICANT CHANGE UP
RH IG SCN BLD-IMP: POSITIVE — SIGNIFICANT CHANGE UP

## 2017-11-14 PROCEDURE — S2068: CPT | Mod: 62,RT

## 2017-11-14 PROCEDURE — 88307 TISSUE EXAM BY PATHOLOGIST: CPT | Mod: 26

## 2017-11-14 PROCEDURE — 88331 PATH CONSLTJ SURG 1 BLK 1SPC: CPT | Mod: 26

## 2017-11-14 PROCEDURE — 38792 RA TRACER ID OF SENTINL NODE: CPT

## 2017-11-14 PROCEDURE — 88309 TISSUE EXAM BY PATHOLOGIST: CPT | Mod: 26

## 2017-11-14 PROCEDURE — S2068: CPT | Mod: 62,RT,GC

## 2017-11-14 PROCEDURE — 74000: CPT | Mod: 26

## 2017-11-14 RX ORDER — BENZOCAINE AND MENTHOL 5; 1 G/100ML; G/100ML
1 LIQUID ORAL THREE TIMES A DAY
Qty: 0 | Refills: 0 | Status: DISCONTINUED | OUTPATIENT
Start: 2017-11-14 | End: 2017-11-18

## 2017-11-14 RX ORDER — KETOROLAC TROMETHAMINE 30 MG/ML
15 SYRINGE (ML) INJECTION EVERY 6 HOURS
Qty: 0 | Refills: 0 | Status: DISCONTINUED | OUTPATIENT
Start: 2017-11-14 | End: 2017-11-15

## 2017-11-14 RX ORDER — ACETAMINOPHEN 500 MG
1000 TABLET ORAL ONCE
Qty: 0 | Refills: 0 | Status: COMPLETED | OUTPATIENT
Start: 2017-11-15 | End: 2017-11-15

## 2017-11-14 RX ORDER — SODIUM CHLORIDE 9 MG/ML
1000 INJECTION, SOLUTION INTRAVENOUS
Qty: 0 | Refills: 0 | Status: DISCONTINUED | OUTPATIENT
Start: 2017-11-14 | End: 2017-11-14

## 2017-11-14 RX ORDER — ALPRAZOLAM 0.25 MG
0.25 TABLET ORAL AT BEDTIME
Qty: 0 | Refills: 0 | Status: DISCONTINUED | OUTPATIENT
Start: 2017-11-14 | End: 2017-11-15

## 2017-11-14 RX ORDER — ACETAMINOPHEN 500 MG
1000 TABLET ORAL ONCE
Qty: 0 | Refills: 0 | Status: COMPLETED | OUTPATIENT
Start: 2017-11-14 | End: 2017-11-14

## 2017-11-14 RX ORDER — ONDANSETRON 8 MG/1
4 TABLET, FILM COATED ORAL EVERY 4 HOURS
Qty: 0 | Refills: 0 | Status: DISCONTINUED | OUTPATIENT
Start: 2017-11-14 | End: 2017-11-18

## 2017-11-14 RX ORDER — MORPHINE SULFATE 50 MG/1
2 CAPSULE, EXTENDED RELEASE ORAL EVERY 4 HOURS
Qty: 0 | Refills: 0 | Status: DISCONTINUED | OUTPATIENT
Start: 2017-11-14 | End: 2017-11-14

## 2017-11-14 RX ORDER — OXYCODONE HYDROCHLORIDE 5 MG/1
10 TABLET ORAL EVERY 4 HOURS
Qty: 0 | Refills: 0 | Status: DISCONTINUED | OUTPATIENT
Start: 2017-11-14 | End: 2017-11-18

## 2017-11-14 RX ORDER — OXYCODONE HYDROCHLORIDE 5 MG/1
5 TABLET ORAL EVERY 4 HOURS
Qty: 0 | Refills: 0 | Status: DISCONTINUED | OUTPATIENT
Start: 2017-11-14 | End: 2017-11-18

## 2017-11-14 RX ORDER — ONDANSETRON 8 MG/1
4 TABLET, FILM COATED ORAL
Qty: 0 | Refills: 0 | Status: DISCONTINUED | OUTPATIENT
Start: 2017-11-14 | End: 2017-11-14

## 2017-11-14 RX ORDER — HEPARIN SODIUM 5000 [USP'U]/ML
5000 INJECTION INTRAVENOUS; SUBCUTANEOUS EVERY 12 HOURS
Qty: 0 | Refills: 0 | Status: DISCONTINUED | OUTPATIENT
Start: 2017-11-14 | End: 2017-11-18

## 2017-11-14 RX ORDER — DIPHENHYDRAMINE HCL 50 MG
25 CAPSULE ORAL EVERY 4 HOURS
Qty: 0 | Refills: 0 | Status: DISCONTINUED | OUTPATIENT
Start: 2017-11-14 | End: 2017-11-18

## 2017-11-14 RX ORDER — SODIUM CHLORIDE 9 MG/ML
1000 INJECTION, SOLUTION INTRAVENOUS
Qty: 0 | Refills: 0 | Status: DISCONTINUED | OUTPATIENT
Start: 2017-11-14 | End: 2017-11-16

## 2017-11-14 RX ORDER — HYDROMORPHONE HYDROCHLORIDE 2 MG/ML
0.5 INJECTION INTRAMUSCULAR; INTRAVENOUS; SUBCUTANEOUS
Qty: 0 | Refills: 0 | Status: DISCONTINUED | OUTPATIENT
Start: 2017-11-14 | End: 2017-11-16

## 2017-11-14 RX ORDER — CEFAZOLIN SODIUM 1 G
2000 VIAL (EA) INJECTION EVERY 8 HOURS
Qty: 0 | Refills: 0 | Status: COMPLETED | OUTPATIENT
Start: 2017-11-14 | End: 2017-11-15

## 2017-11-14 RX ADMIN — Medication 15 MILLIGRAM(S): at 18:14

## 2017-11-14 RX ADMIN — Medication 100 MILLIGRAM(S): at 23:52

## 2017-11-14 RX ADMIN — Medication 1000 MILLIGRAM(S): at 20:26

## 2017-11-14 RX ADMIN — Medication 0.25 MILLIGRAM(S): at 23:51

## 2017-11-14 RX ADMIN — Medication 15 MILLIGRAM(S): at 23:51

## 2017-11-14 RX ADMIN — HYDROMORPHONE HYDROCHLORIDE 0.5 MILLIGRAM(S): 2 INJECTION INTRAMUSCULAR; INTRAVENOUS; SUBCUTANEOUS at 18:06

## 2017-11-14 RX ADMIN — HEPARIN SODIUM 5000 UNIT(S): 5000 INJECTION INTRAVENOUS; SUBCUTANEOUS at 20:55

## 2017-11-14 RX ADMIN — SODIUM CHLORIDE 125 MILLILITER(S): 9 INJECTION, SOLUTION INTRAVENOUS at 17:34

## 2017-11-14 RX ADMIN — Medication 15 MILLIGRAM(S): at 18:29

## 2017-11-14 RX ADMIN — HYDROMORPHONE HYDROCHLORIDE 0.5 MILLIGRAM(S): 2 INJECTION INTRAMUSCULAR; INTRAVENOUS; SUBCUTANEOUS at 17:33

## 2017-11-14 RX ADMIN — Medication 400 MILLIGRAM(S): at 20:18

## 2017-11-14 RX ADMIN — Medication 1 MILLIGRAM(S): at 19:00

## 2017-11-14 NOTE — BRIEF OPERATIVE NOTE - PROCEDURE
<<-----Click on this checkbox to enter Procedure VALENTINE flap, free  11/14/2017  Bilateral breast reconstruction with VALENTINE flap  Active  TEJA

## 2017-11-14 NOTE — BRIEF OPERATIVE NOTE - SPECIMENS
NA
left axillary sentinel lymph nodes x 4, right axillary sentinel lymph nodes x 3, left breast, right breast

## 2017-11-14 NOTE — BRIEF OPERATIVE NOTE - PROCEDURE
<<-----Click on this checkbox to enter Procedure Mastectomy, bilateral, with sentinel node biopsy  11/14/2017    Active  [facility]

## 2017-11-14 NOTE — BRIEF OPERATIVE NOTE - POST-OP DX
Ductal carcinoma in situ (DCIS) of right breast  11/14/2017    Active  Palleschi, Susan M  Malignant neoplasm of right female breast, unspecified estrogen receptor status, unspecified site of breast  11/14/2017    Active  Yuko Gramajo
Ductal carcinoma in situ (DCIS) of right breast  11/14/2017    Active  Palleschi, Susan M

## 2017-11-15 ENCOUNTER — TRANSCRIPTION ENCOUNTER (OUTPATIENT)
Age: 52
End: 2017-11-15

## 2017-11-15 LAB
BUN SERPL-MCNC: 10 MG/DL — SIGNIFICANT CHANGE UP (ref 7–23)
CALCIUM SERPL-MCNC: 8.2 MG/DL — LOW (ref 8.4–10.5)
CHLORIDE SERPL-SCNC: 101 MMOL/L — SIGNIFICANT CHANGE UP (ref 98–107)
CO2 SERPL-SCNC: 24 MMOL/L — SIGNIFICANT CHANGE UP (ref 22–31)
CREAT SERPL-MCNC: 0.75 MG/DL — SIGNIFICANT CHANGE UP (ref 0.5–1.3)
GLUCOSE SERPL-MCNC: 101 MG/DL — HIGH (ref 70–99)
HCT VFR BLD CALC: 23.2 % — LOW (ref 34.5–45)
HCT VFR BLD CALC: 23.6 % — LOW (ref 34.5–45)
HGB BLD-MCNC: 7.5 G/DL — LOW (ref 11.5–15.5)
HGB BLD-MCNC: 7.8 G/DL — LOW (ref 11.5–15.5)
MCHC RBC-ENTMCNC: 29.9 PG — SIGNIFICANT CHANGE UP (ref 27–34)
MCHC RBC-ENTMCNC: 30.4 PG — SIGNIFICANT CHANGE UP (ref 27–34)
MCHC RBC-ENTMCNC: 32.3 % — SIGNIFICANT CHANGE UP (ref 32–36)
MCHC RBC-ENTMCNC: 33.1 % — SIGNIFICANT CHANGE UP (ref 32–36)
MCV RBC AUTO: 91.8 FL — SIGNIFICANT CHANGE UP (ref 80–100)
MCV RBC AUTO: 92.4 FL — SIGNIFICANT CHANGE UP (ref 80–100)
NRBC # FLD: 0 — SIGNIFICANT CHANGE UP
NRBC # FLD: 0 — SIGNIFICANT CHANGE UP
PLATELET # BLD AUTO: 178 K/UL — SIGNIFICANT CHANGE UP (ref 150–400)
PLATELET # BLD AUTO: 184 K/UL — SIGNIFICANT CHANGE UP (ref 150–400)
PMV BLD: 9.6 FL — SIGNIFICANT CHANGE UP (ref 7–13)
PMV BLD: 9.6 FL — SIGNIFICANT CHANGE UP (ref 7–13)
POTASSIUM SERPL-MCNC: 4.2 MMOL/L — SIGNIFICANT CHANGE UP (ref 3.5–5.3)
POTASSIUM SERPL-SCNC: 4.2 MMOL/L — SIGNIFICANT CHANGE UP (ref 3.5–5.3)
RBC # BLD: 2.51 M/UL — LOW (ref 3.8–5.2)
RBC # BLD: 2.57 M/UL — LOW (ref 3.8–5.2)
RBC # FLD: 12.1 % — SIGNIFICANT CHANGE UP (ref 10.3–14.5)
RBC # FLD: 12.2 % — SIGNIFICANT CHANGE UP (ref 10.3–14.5)
SODIUM SERPL-SCNC: 138 MMOL/L — SIGNIFICANT CHANGE UP (ref 135–145)
WBC # BLD: 7.06 K/UL — SIGNIFICANT CHANGE UP (ref 3.8–10.5)
WBC # BLD: 7.6 K/UL — SIGNIFICANT CHANGE UP (ref 3.8–10.5)
WBC # FLD AUTO: 7.06 K/UL — SIGNIFICANT CHANGE UP (ref 3.8–10.5)
WBC # FLD AUTO: 7.6 K/UL — SIGNIFICANT CHANGE UP (ref 3.8–10.5)

## 2017-11-15 RX ORDER — PANTOPRAZOLE SODIUM 20 MG/1
40 TABLET, DELAYED RELEASE ORAL
Qty: 0 | Refills: 0 | Status: DISCONTINUED | OUTPATIENT
Start: 2017-11-15 | End: 2017-11-18

## 2017-11-15 RX ORDER — FENTANYL CITRATE 50 UG/ML
50 INJECTION INTRAVENOUS
Qty: 0 | Refills: 0 | Status: DISCONTINUED | OUTPATIENT
Start: 2017-11-15 | End: 2017-11-16

## 2017-11-15 RX ORDER — IBUPROFEN 200 MG
600 TABLET ORAL EVERY 6 HOURS
Qty: 0 | Refills: 0 | Status: DISCONTINUED | OUTPATIENT
Start: 2017-11-15 | End: 2017-11-18

## 2017-11-15 RX ORDER — ALPRAZOLAM 0.25 MG
0.25 TABLET ORAL EVERY 6 HOURS
Qty: 0 | Refills: 0 | Status: DISCONTINUED | OUTPATIENT
Start: 2017-11-15 | End: 2017-11-18

## 2017-11-15 RX ORDER — ACETAMINOPHEN 500 MG
975 TABLET ORAL EVERY 8 HOURS
Qty: 0 | Refills: 0 | Status: DISCONTINUED | OUTPATIENT
Start: 2017-11-15 | End: 2017-11-18

## 2017-11-15 RX ORDER — FENTANYL CITRATE 50 UG/ML
25 INJECTION INTRAVENOUS
Qty: 0 | Refills: 0 | Status: DISCONTINUED | OUTPATIENT
Start: 2017-11-15 | End: 2017-11-16

## 2017-11-15 RX ORDER — ACETAMINOPHEN 500 MG
1000 TABLET ORAL ONCE
Qty: 0 | Refills: 0 | Status: COMPLETED | OUTPATIENT
Start: 2017-11-15 | End: 2017-11-15

## 2017-11-15 RX ORDER — BENZOCAINE AND MENTHOL 5; 1 G/100ML; G/100ML
1 LIQUID ORAL
Qty: 0 | Refills: 0 | Status: DISCONTINUED | OUTPATIENT
Start: 2017-11-15 | End: 2017-11-15

## 2017-11-15 RX ORDER — OXYCODONE HYDROCHLORIDE 5 MG/1
5 TABLET ORAL ONCE
Qty: 0 | Refills: 0 | Status: DISCONTINUED | OUTPATIENT
Start: 2017-11-15 | End: 2017-11-16

## 2017-11-15 RX ORDER — ONDANSETRON 8 MG/1
4 TABLET, FILM COATED ORAL ONCE
Qty: 0 | Refills: 0 | Status: DISCONTINUED | OUTPATIENT
Start: 2017-11-15 | End: 2017-11-16

## 2017-11-15 RX ADMIN — FENTANYL CITRATE 25 MICROGRAM(S): 50 INJECTION INTRAVENOUS at 21:05

## 2017-11-15 RX ADMIN — Medication 400 MILLIGRAM(S): at 15:35

## 2017-11-15 RX ADMIN — HEPARIN SODIUM 5000 UNIT(S): 5000 INJECTION INTRAVENOUS; SUBCUTANEOUS at 09:56

## 2017-11-15 RX ADMIN — HYDROMORPHONE HYDROCHLORIDE 0.5 MILLIGRAM(S): 2 INJECTION INTRAMUSCULAR; INTRAVENOUS; SUBCUTANEOUS at 08:24

## 2017-11-15 RX ADMIN — Medication 1000 MILLIGRAM(S): at 16:00

## 2017-11-15 RX ADMIN — Medication 15 MILLIGRAM(S): at 00:12

## 2017-11-15 RX ADMIN — Medication 15 MILLIGRAM(S): at 06:05

## 2017-11-15 RX ADMIN — Medication 15 MILLIGRAM(S): at 06:30

## 2017-11-15 RX ADMIN — Medication 975 MILLIGRAM(S): at 23:30

## 2017-11-15 RX ADMIN — HEPARIN SODIUM 5000 UNIT(S): 5000 INJECTION INTRAVENOUS; SUBCUTANEOUS at 20:31

## 2017-11-15 RX ADMIN — Medication 400 MILLIGRAM(S): at 03:10

## 2017-11-15 RX ADMIN — HYDROMORPHONE HYDROCHLORIDE 0.5 MILLIGRAM(S): 2 INJECTION INTRAMUSCULAR; INTRAVENOUS; SUBCUTANEOUS at 09:08

## 2017-11-15 RX ADMIN — FENTANYL CITRATE 25 MICROGRAM(S): 50 INJECTION INTRAVENOUS at 20:47

## 2017-11-15 RX ADMIN — Medication 100 MILLIGRAM(S): at 06:34

## 2017-11-15 RX ADMIN — Medication 975 MILLIGRAM(S): at 22:20

## 2017-11-15 RX ADMIN — Medication 0.25 MILLIGRAM(S): at 08:26

## 2017-11-15 RX ADMIN — SODIUM CHLORIDE 75 MILLILITER(S): 9 INJECTION, SOLUTION INTRAVENOUS at 14:50

## 2017-11-15 RX ADMIN — BENZOCAINE AND MENTHOL 1 LOZENGE: 5; 1 LIQUID ORAL at 20:55

## 2017-11-15 RX ADMIN — Medication 1000 MILLIGRAM(S): at 03:10

## 2017-11-15 NOTE — PROGRESS NOTE ADULT - ATTENDING COMMENTS
I saw and examined the patient in the PACU.  She denies any pain, cp, sob, or fevers  She "feels really good"    On exam, both breasts are soft and symmetric.  Axillary areas are soft.  There is no mastectomy skin flap ischemia.  Similarly abdomen is soft.  Umbilicus viable    plan:  transfer to floor  regular diet  oob to chair   d/c trena in afternoon

## 2017-11-15 NOTE — PROGRESS NOTE ADULT - ASSESSMENT
A/P 52F s/p b/l mastectomy and VALENTINE flap reconstruction POD1  - Pending AM labs  - Once AM labs return, will put in POD1 orders including:  - Transfer to floor  - OOB to chair  - Regular diet  - q2hour flap checks  - ARTUR albrecht when OOB  - DVT ppx  - Pain control    Emma Muniz PGY2

## 2017-11-15 NOTE — PROGRESS NOTE ADULT - SUBJECTIVE AND OBJECTIVE BOX
Plastic Surgery    SUBJECTIVE:   Patient feels well this morning.  States she had a good night's sleep overnight.    VITALS  T(C): 36.5 (11-15-17 @ 04:00), Max: 37.2 (11-14-17 @ 21:00)  HR: 83 (11-15-17 @ 06:00) (73 - 99)  BP: 96/52 (11-15-17 @ 06:00) (94/56 - 132/76)  BP(mean): --  RR: 15 (11-15-17 @ 06:00) (9 - 19)  SpO2: 100% (11-15-17 @ 06:00) (99% - 100%)  Wt(kg): --  CAPILLARY BLOOD GLUCOSE          Is/Os    11-14 @ 07:01  -  11-15 @ 06:44  --------------------------------------------------------  IN:    lactated ringers.: 1750 mL  Total IN: 1750 mL    OUT:    Bulb: 49.5 mL    Bulb: 45 mL    Bulb: 41 mL    Bulb: 105 mL    Bulb: 45 mL    Bulb: 39 mL    Indwelling Catheter - Urethral: 975 mL  Total OUT: 1299.5 mL    Total NET: 450.5 mL          PHYSICAL EXAM:   General: NAD, Lying in bed comfortably  Chest: Bilateral VALENTINE flaps warm, soft, 3s CRT, no collections. Vioptix stable. Mastectomy flaps with minimal bruising.   Abd: Soft, no collections, incision intact. Umbo with xeroform.   Drains SS    MEDICATIONS (STANDING): heparin  Injectable 5000 Unit(s) SubCutaneous every 12 hours  ketorolac   Injectable 15 milliGRAM(s) IV Push every 6 hours  lactated ringers. 1000 milliLiter(s) IV Continuous <Continuous>    MEDICATIONS (PRN):ALPRAZolam 0.25 milliGRAM(s) Oral at bedtime PRN anxiety  diazepam    Tablet 5 milliGRAM(s) Oral every 8 hours PRN Muscle spasm  diphenhydrAMINE   Capsule 25 milliGRAM(s) Oral every 4 hours PRN Rash and/or Itching  HYDROmorphone  Injectable 0.5 milliGRAM(s) IV Push every 10 minutes PRN Moderate Pain (4 - 6)  ondansetron Injectable 4 milliGRAM(s) IV Push every 4 hours PRN Nausea and/or Vomiting  oxyCODONE    IR 5 milliGRAM(s) Oral every 4 hours PRN Moderate Pain (4 - 6)  oxyCODONE    IR 10 milliGRAM(s) Oral every 4 hours PRN Severe Pain (7 - 10)

## 2017-11-16 ENCOUNTER — TRANSCRIPTION ENCOUNTER (OUTPATIENT)
Age: 52
End: 2017-11-16

## 2017-11-16 LAB — SURGICAL PATHOLOGY STUDY: SIGNIFICANT CHANGE UP

## 2017-11-16 RX ADMIN — Medication 975 MILLIGRAM(S): at 15:00

## 2017-11-16 RX ADMIN — Medication 975 MILLIGRAM(S): at 06:20

## 2017-11-16 RX ADMIN — Medication 600 MILLIGRAM(S): at 13:10

## 2017-11-16 RX ADMIN — Medication 600 MILLIGRAM(S): at 00:08

## 2017-11-16 RX ADMIN — Medication 600 MILLIGRAM(S): at 07:00

## 2017-11-16 RX ADMIN — Medication 975 MILLIGRAM(S): at 22:14

## 2017-11-16 RX ADMIN — OXYCODONE HYDROCHLORIDE 5 MILLIGRAM(S): 5 TABLET ORAL at 22:15

## 2017-11-16 RX ADMIN — Medication 600 MILLIGRAM(S): at 01:00

## 2017-11-16 RX ADMIN — PANTOPRAZOLE SODIUM 40 MILLIGRAM(S): 20 TABLET, DELAYED RELEASE ORAL at 06:38

## 2017-11-16 RX ADMIN — HEPARIN SODIUM 5000 UNIT(S): 5000 INJECTION INTRAVENOUS; SUBCUTANEOUS at 07:30

## 2017-11-16 RX ADMIN — Medication 600 MILLIGRAM(S): at 06:20

## 2017-11-16 RX ADMIN — OXYCODONE HYDROCHLORIDE 5 MILLIGRAM(S): 5 TABLET ORAL at 14:29

## 2017-11-16 RX ADMIN — Medication 600 MILLIGRAM(S): at 18:46

## 2017-11-16 RX ADMIN — Medication 600 MILLIGRAM(S): at 19:40

## 2017-11-16 RX ADMIN — OXYCODONE HYDROCHLORIDE 5 MILLIGRAM(S): 5 TABLET ORAL at 10:28

## 2017-11-16 RX ADMIN — OXYCODONE HYDROCHLORIDE 5 MILLIGRAM(S): 5 TABLET ORAL at 03:54

## 2017-11-16 RX ADMIN — HEPARIN SODIUM 5000 UNIT(S): 5000 INJECTION INTRAVENOUS; SUBCUTANEOUS at 19:44

## 2017-11-16 RX ADMIN — BENZOCAINE AND MENTHOL 1 LOZENGE: 5; 1 LIQUID ORAL at 02:23

## 2017-11-16 RX ADMIN — ONDANSETRON 4 MILLIGRAM(S): 8 TABLET, FILM COATED ORAL at 19:13

## 2017-11-16 RX ADMIN — Medication 975 MILLIGRAM(S): at 07:00

## 2017-11-16 RX ADMIN — OXYCODONE HYDROCHLORIDE 5 MILLIGRAM(S): 5 TABLET ORAL at 23:00

## 2017-11-16 RX ADMIN — OXYCODONE HYDROCHLORIDE 5 MILLIGRAM(S): 5 TABLET ORAL at 15:00

## 2017-11-16 RX ADMIN — Medication 600 MILLIGRAM(S): at 13:45

## 2017-11-16 RX ADMIN — Medication 975 MILLIGRAM(S): at 23:00

## 2017-11-16 RX ADMIN — OXYCODONE HYDROCHLORIDE 5 MILLIGRAM(S): 5 TABLET ORAL at 04:30

## 2017-11-16 RX ADMIN — Medication 975 MILLIGRAM(S): at 14:29

## 2017-11-16 NOTE — DISCHARGE NOTE ADULT - CARE PROVIDER_API CALL
Dillan Isaac; MAGDA), Otolaryngology; Plastic Surgery  [address] [facility]  [address]  [place] [address]  Phone: [phone]  Fax: [phone]    Palleschi, Susan M (MD), Surgery  [address] [place] [address]  [address]  [place] [address]  Phone: [phone]  Fax: [phone]

## 2017-11-16 NOTE — PROGRESS NOTE ADULT - ASSESSMENT
the patient is placed in postmastectomy garment to provide mammary support  she is then put in upright position and flap is viable/stable with good vioptix  the patient is then put into chair and exam is similar    plan:  encourage ambulation  d/c trena tomorrow

## 2017-11-16 NOTE — DISCHARGE NOTE ADULT - ADDITIONAL INSTRUCTIONS
Please follow up with Dr. Isaac within x1 week after discharge from the hospital. You may call [phone] to schedule an appointment.     Please follow up with Dr. Palleschi within 1-2 weeks after discharge from the hospital. You may call [phone] to scheduled an appointment.

## 2017-11-16 NOTE — PROGRESS NOTE ADULT - SUBJECTIVE AND OBJECTIVE BOX
patient is doing well   she has no cp, no sob, no fevers    breast flaps soft and viable  incisions intact  abdomen soft, incision intact

## 2017-11-16 NOTE — DISCHARGE NOTE ADULT - HOSPITAL COURSE
52F underwent bilateral simple mastectomies and bilateral breast reconstruction with b/l VALENTINE flaps in the OR. The patient tolerated the procedure well. Postoperatively the patient was sent to the PACU. In the PACU she was found to have an acute change in her vioptix, the flap look congested w/ brisk capillary refill; she was thus taken back to the OR for emergent exploration, during which the vein was found to be kinked; appropriate measures were taken to reposition the vein and and flap. She tolerated the procedure well.    She then returned to and remained in the PACU overnight. The patient's flaps were monitored by Vioptix and by clinical examination. On POD 1, the patient was hemodynamically stable; was transferred to a surgical floor; was advanced to a regular diet; was placed on her home medications; and was out of bed to a chair. On POD 2, the patient's Ray catheter was removed; Vioptix monitors were removed; and the patient ambulated. During the patient's hospital course, the patient's pain was controlled by IV pain medications and then by PO pain medications.    At the time of discharge, the patient was hemodynamically stable, was tolerating PO diet, was voiding urine and passing stool, was ambulating, and was comfortable with adequate pain control. The patient was instructed to follow up with Dr. Isaac within x1 week(s) after discharge from the hospital and Dr. Palleschi within x1 week(s) after discharge from the hospital. The patient/family felt comfortable with discharge. The patient had no other issues.

## 2017-11-16 NOTE — DISCHARGE NOTE ADULT - CARE PROVIDERS DIRECT ADDRESSES
[email or web address]

## 2017-11-16 NOTE — DISCHARGE NOTE ADULT - MEDICATION SUMMARY - MEDICATIONS TO TAKE
I will START or STAY ON the medications listed below when I get home from the hospital:    centrum nail and body  -- 1 tab oral daily.  last dose 11/8/17  -- Indication: For Home medication    acetaminophen 325 mg oral tablet  -- 3 tab(s) by mouth every 8 hours  -- Indication: For Pain    ibuprofen 600 mg oral tablet  -- 1 tab(s) by mouth every 6 hours  -- Indication: For Pain/Flap    aspirin 81 mg oral tablet  -- 1 tab(s) by mouth once a day  -- Indication: For Flap    oxyCODONE 5 mg oral tablet  -- 1 tab(s) by mouth every 4 hours, As needed, Moderate Pain (4 - 6)  -- Indication: For Pain    oxyCODONE 10 mg oral tablet  -- 1 tab(s) by mouth every 4 hours, As needed, Severe Pain (7 - 10)  -- Indication: For Pain    Xanax 0.5 mg oral tablet  -- 1 tab(s) by mouth once a day, As Needed takes 1/2 tablet at night  -- Indication: For Home medication    CoQ10 300 mg oral capsule  -- 1 cap(s) by mouth once a day last dose on 11/8/17  -- Indication: For Home medication    omeprazole 40 mg oral delayed release capsule  -- 1 cap(s) by mouth once a day in am  -- Indication: For Home medication    Centrum oral tablet  -- 1 tab(s) by mouth once a day n am last dose on 11/8/17  -- Indication: For Home medication    Vitamin B12 100 mcg oral tablet  -- 1 tab(s) by mouth once a day  -- Indication: For Home medication    Vitamin B6 100 mg oral tablet  -- 1 tab(s) by mouth once a day in am  -- Indication: For Home medication    Vitamin D3 2000 intl units oral capsule  -- 1 cap(s) by mouth once a day  -- Indication: For Home medication I will START or STAY ON the medications listed below when I get home from the hospital:    centrum nail and body  -- 1 tab oral daily.  last dose 11/8/17  -- Indication: For Home medication    acetaminophen 325 mg oral tablet  -- 3 tab(s) by mouth every 8 hours  -- Indication: For Pain    ibuprofen 600 mg oral tablet  -- 1 tab(s) by mouth every 6 hours  -- Indication: For Pain/Flap    aspirin 81 mg oral tablet  -- 1 tab(s) by mouth once a day  -- Indication: For Flap    oxyCODONE 5 mg oral tablet  -- 1 tab(s) by mouth every 4 hours, As needed, Moderate Pain (4 - 6)  -- Indication: For Pain    oxyCODONE 10 mg oral tablet  -- 1 tab(s) by mouth every 4 hours, As needed, Severe Pain (7 - 10)  -- Indication: For Pain    Zofran ODT 4 mg oral tablet, disintegrating  -- 1 tab(s) by mouth 3 times a day   -- Indication: For Nausea    Xanax 0.5 mg oral tablet  -- 1 tab(s) by mouth once a day, As Needed takes 1/2 tablet at night  -- Indication: For Home medication    CoQ10 300 mg oral capsule  -- 1 cap(s) by mouth once a day last dose on 11/8/17  -- Indication: For Home medication    omeprazole 40 mg oral delayed release capsule  -- 1 cap(s) by mouth once a day in am  -- Indication: For Home medication    Centrum oral tablet  -- 1 tab(s) by mouth once a day n am last dose on 11/8/17  -- Indication: For Home medication    Vitamin B12 100 mcg oral tablet  -- 1 tab(s) by mouth once a day  -- Indication: For Home medication    Vitamin B6 100 mg oral tablet  -- 1 tab(s) by mouth once a day in am  -- Indication: For Home medication    Vitamin D3 2000 intl units oral capsule  -- 1 cap(s) by mouth once a day  -- Indication: For Home medication I will START or STAY ON the medications listed below when I get home from the hospital:    centrum nail and body  -- 1 tab oral daily.  last dose 11/8/17  -- Indication: For Home medication    acetaminophen 325 mg oral tablet  -- 3 tab(s) by mouth every 8 hours  -- Indication: For Pain    aspirin 81 mg oral tablet  -- 1 tab(s) by mouth once a day  -- Indication: For Flap    oxyCODONE 5 mg oral tablet  -- 1 tab(s) by mouth every 4 hours, As needed, Moderate Pain (4 - 6)  -- Indication: For Pain    oxyCODONE 10 mg oral tablet  -- 1 tab(s) by mouth every 4 hours, As needed, Severe Pain (7 - 10)  -- Indication: For Pain    ibuprofen 600 mg oral tablet  -- 1 tab(s) by mouth every 6 hours  -- Indication: For Flap    Zofran ODT 4 mg oral tablet, disintegrating  -- 1 tab(s) by mouth 3 times a day   -- Indication: For Nausea    Xanax 0.5 mg oral tablet  -- 1 tab(s) by mouth once a day, As Needed takes 1/2 tablet at night  -- Indication: For Home medication    CoQ10 300 mg oral capsule  -- 1 cap(s) by mouth once a day last dose on 11/8/17  -- Indication: For Home medication    omeprazole 40 mg oral delayed release capsule  -- 1 cap(s) by mouth once a day in am  -- Indication: For Home medication    Centrum oral tablet  -- 1 tab(s) by mouth once a day n am last dose on 11/8/17  -- Indication: For Home medication    Vitamin B12 100 mcg oral tablet  -- 1 tab(s) by mouth once a day  -- Indication: For Home medication    Vitamin B6 100 mg oral tablet  -- 1 tab(s) by mouth once a day in am  -- Indication: For Home medication    Vitamin D3 2000 intl units oral capsule  -- 1 cap(s) by mouth once a day  -- Indication: For Home medication

## 2017-11-16 NOTE — DISCHARGE NOTE ADULT - CONDITIONS AT DISCHARGE
Pt vital signs stable, tolerated diet well, voids without difficulty, RAYO teaching given, pt verbalized understanding, return demonstration given, supplies provided. IV d/c before discharge.

## 2017-11-16 NOTE — DISCHARGE NOTE ADULT - PATIENT PORTAL LINK FT
“You can access the [employer] Patient Portal, offered by [facility], by registering with the following website: [email or web address]”

## 2017-11-16 NOTE — DISCHARGE NOTE ADULT - PLAN OF CARE
Postoperative Recovery Activity:  - Rest at home during the first few days after surgery.  - Walking is encouraged, but strenuous exercise is not allowed until 6 weeks after surgery.   - Avoid strenuous activity. Do not lift your arms above your head. Do not lift more than 5-10  pounds.    Sleep:  Sleep on your back for the first two weeks after surgery.    Showering:  - You may take sponge baths following discharge. Pat dry. We will instruct you to shower once you  have drains removed from your breasts in the office.  - Do not take a bath or submerge yourself in water.  - You will have special adhesive glue or tape over the incisions. Do not take these off. For the Breast:  You have just undergone a breast reconstruction with the VALENTINE flap. Your breast will likely have bruising  and possibly some blistering on the skin, which is expected after a mastectomy. You have a small patch  of skin on your breasts, which is a different color than the surrounding breast skin. This paddle of skin  comes from the abdomen and is an indicator of how the flap is doing. It is important to check this skin  paddle daily. The skin should remain the same color. If the color of the small patch changes (i.e blue,  purple, pale), please call the office immediately ([phone]).    For the Abdomen:  Your incision and belly button are covered in a special medical grade sealant, which will come off in the  office, 2-3 weeks after surgery.    Drains:  Both the breast and abdomen will have drains. It is important to empty the drains twice daily and  record the outputs. Please bring this sheet to your appointment after surgery. Based on the output, the  drains will be removed 1 to 3 weeks after surgery. For the drains to be working appropriately, the bulbs  need to be collapsed to create a light suction. The nurse in the hospital will review the drain care with  you and your family prior to discharge home. It is best to safely secure the drains to your clothes with a  safety pin. In an effort to make you more comfortable with discharge home and to answer any questions you may  have, I have prepared an instructional sheet for you. However, you may call the office at [phone]  at any time with additional questions or concerns.    Call the Office:  Do not hesitate to call the office with any concerns or questions. A doctor is available to answer your  questions 24 hours a day. Please notify us immediately at [phone] if:  1. You have increased swelling, pain, or color change in the breast.  2. One breast becomes suddenly significantly larger than the other breast.  3. You have a sudden increase in swelling of the abdomen.  4. You have redness develop around the incisions.  5. You have a fever greater than 101 F.  6. You develop sudden increase in pain.  7. You develop drainage, spreading redness or foul odor  8. You have any questions.

## 2017-11-16 NOTE — DISCHARGE NOTE ADULT - NSTOBACCOHOTLINE_GEN_A_NCS
[facility] Smokers Quitline ([phone]-[phone]) [facility] Smokers Quitline ([phone]-DW-DSNHX)

## 2017-11-16 NOTE — DISCHARGE NOTE ADULT - CARE PLAN
Principal Discharge DX:	Malignant neoplasm of breast  Goal:	Postoperative Recovery  Instructions for follow-up, activity and diet:	Activity:  - Rest at home during the first few days after surgery.  - Walking is encouraged, but strenuous exercise is not allowed until 6 weeks after surgery.   - Avoid strenuous activity. Do not lift your arms above your head. Do not lift more than 5-10  pounds.    Sleep:  Sleep on your back for the first two weeks after surgery.    Showering:  - You may take sponge baths following discharge. Pat dry. We will instruct you to shower once you  have drains removed from your breasts in the office.  - Do not take a bath or submerge yourself in water.  - You will have special adhesive glue or tape over the incisions. Do not take these off.  Instructions for follow-up, activity and diet:	For the Breast:  You have just undergone a breast reconstruction with the VALENTINE flap. Your breast will likely have bruising  and possibly some blistering on the skin, which is expected after a mastectomy. You have a small patch  of skin on your breasts, which is a different color than the surrounding breast skin. This paddle of skin  comes from the abdomen and is an indicator of how the flap is doing. It is important to check this skin  paddle daily. The skin should remain the same color. If the color of the small patch changes (i.e blue,  purple, pale), please call the office immediately ([phone]).    For the Abdomen:  Your incision and belly button are covered in a special medical grade sealant, which will come off in the  office, 2-3 weeks after surgery.    Drains:  Both the breast and abdomen will have drains. It is important to empty the drains twice daily and  record the outputs. Please bring this sheet to your appointment after surgery. Based on the output, the  drains will be removed 1 to 3 weeks after surgery. For the drains to be working appropriately, the bulbs  need to be collapsed to create a light suction. The nurse in the hospital will review the drain care with  you and your family prior to discharge home. It is best to safely secure the drains to your clothes with a  safety pin.  Instructions for follow-up, activity and diet:	In an effort to make you more comfortable with discharge home and to answer any questions you may  have, I have prepared an instructional sheet for you. However, you may call the office at [phone]  at any time with additional questions or concerns.    Call the Office:  Do not hesitate to call the office with any concerns or questions. A doctor is available to answer your  questions 24 hours a day. Please notify us immediately at [phone] if:  1. You have increased swelling, pain, or color change in the breast.  2. One breast becomes suddenly significantly larger than the other breast.  3. You have a sudden increase in swelling of the abdomen.  4. You have redness develop around the incisions.  5. You have a fever greater than 101 F.  6. You develop sudden increase in pain.  7. You develop drainage, spreading redness or foul odor  8. You have any questions.

## 2017-11-16 NOTE — DISCHARGE NOTE ADULT - INSTRUCTIONS
The patient may resume a regular diet. Call MD for fever greater than 101, nausea, vomiting, increased pain, pus drainage from incision, or go to ER.

## 2017-11-17 RX ORDER — METOCLOPRAMIDE HCL 10 MG
5 TABLET ORAL ONCE
Qty: 0 | Refills: 0 | Status: COMPLETED | OUTPATIENT
Start: 2017-11-17 | End: 2017-11-17

## 2017-11-17 RX ORDER — DOCUSATE SODIUM 100 MG
100 CAPSULE ORAL THREE TIMES A DAY
Qty: 0 | Refills: 0 | Status: DISCONTINUED | OUTPATIENT
Start: 2017-11-17 | End: 2017-11-18

## 2017-11-17 RX ADMIN — Medication 600 MILLIGRAM(S): at 11:30

## 2017-11-17 RX ADMIN — HEPARIN SODIUM 5000 UNIT(S): 5000 INJECTION INTRAVENOUS; SUBCUTANEOUS at 08:12

## 2017-11-17 RX ADMIN — PANTOPRAZOLE SODIUM 40 MILLIGRAM(S): 20 TABLET, DELAYED RELEASE ORAL at 06:16

## 2017-11-17 RX ADMIN — Medication 100 MILLIGRAM(S): at 21:42

## 2017-11-17 RX ADMIN — ONDANSETRON 4 MILLIGRAM(S): 8 TABLET, FILM COATED ORAL at 14:26

## 2017-11-17 RX ADMIN — Medication 600 MILLIGRAM(S): at 01:00

## 2017-11-17 RX ADMIN — OXYCODONE HYDROCHLORIDE 5 MILLIGRAM(S): 5 TABLET ORAL at 16:25

## 2017-11-17 RX ADMIN — Medication 600 MILLIGRAM(S): at 00:10

## 2017-11-17 RX ADMIN — HEPARIN SODIUM 5000 UNIT(S): 5000 INJECTION INTRAVENOUS; SUBCUTANEOUS at 20:26

## 2017-11-17 RX ADMIN — ONDANSETRON 4 MILLIGRAM(S): 8 TABLET, FILM COATED ORAL at 20:26

## 2017-11-17 RX ADMIN — Medication 5 MILLIGRAM(S): at 09:33

## 2017-11-17 RX ADMIN — Medication 975 MILLIGRAM(S): at 06:17

## 2017-11-17 RX ADMIN — ONDANSETRON 4 MILLIGRAM(S): 8 TABLET, FILM COATED ORAL at 06:38

## 2017-11-17 RX ADMIN — OXYCODONE HYDROCHLORIDE 5 MILLIGRAM(S): 5 TABLET ORAL at 16:55

## 2017-11-17 RX ADMIN — Medication 600 MILLIGRAM(S): at 06:12

## 2017-11-17 RX ADMIN — Medication 0.25 MILLIGRAM(S): at 03:12

## 2017-11-17 RX ADMIN — Medication 600 MILLIGRAM(S): at 23:48

## 2017-11-17 RX ADMIN — Medication 600 MILLIGRAM(S): at 17:46

## 2017-11-17 RX ADMIN — Medication 975 MILLIGRAM(S): at 21:41

## 2017-11-17 RX ADMIN — Medication 975 MILLIGRAM(S): at 14:27

## 2017-11-17 NOTE — PROGRESS NOTE ADULT - SUBJECTIVE AND OBJECTIVE BOX
doing well  no c/o. no cp, no sob, no fevers  breast exam stable. soft without evidence of hematoma or ischemia  abdomen soft.   stable vioptix

## 2017-11-17 NOTE — PROGRESS NOTE ADULT - ASSESSMENT
A/P 52F s/p VALENTINE flap to b/l breasts after mastectomy POD 3  - Ambulate  - No need for nasal cannula  - Regular diet  - C/w Ancef, SQH, ibuprofen  - D/C albrecht  - D/C vioptix   - q4 flap checks    Emma Muniz PGY2

## 2017-11-17 NOTE — PROGRESS NOTE ADULT - SUBJECTIVE AND OBJECTIVE BOX
Plastic Surgery    SUBJECTIVE:   Patient did well overnight, no complaints. Still anxious wrt flaps.    VITALS  T(C): 36.6 (11-17-17 @ 06:19), Max: 37.4 (11-16-17 @ 13:30)  HR: 69 (11-17-17 @ 06:19) (69 - 88)  BP: 112/73 (11-17-17 @ 06:19) (91/52 - 112/73)  BP(mean): --  RR: 16 (11-17-17 @ 06:19) (12 - 16)  SpO2: 100% (11-17-17 @ 06:19) (100% - 100%)  Wt(kg): --  CAPILLARY BLOOD GLUCOSE          Is/Os    11-16 @ 07:01  -  11-17 @ 07:00  --------------------------------------------------------  IN:    lactated ringers.: 75 mL  Total IN: 75 mL    OUT:    Bulb: 93.5 mL    Bulb: 6.5 mL    Bulb: 52 mL    Bulb: 36.5 mL    Bulb: 20.5 mL    Bulb: 17.5 mL    Indwelling Catheter - Urethral: 2300 mL  Total OUT: 2526.5 mL    Total NET: -2451.5 mL          PHYSICAL EXAM:   General: NAD, Lying in bed comfortably  Chest: B/l VALENTINE flaps warm, 3s CRT, soft, no collections. Vioptix stable. Incisions intact. Drains SS   Abd: Incision line intact, no collections or erythema. Drains SS    MEDICATIONS (STANDING): acetaminophen   Tablet. 975 milliGRAM(s) Oral every 8 hours  heparin  Injectable 5000 Unit(s) SubCutaneous every 12 hours  ibuprofen  Tablet 600 milliGRAM(s) Oral every 6 hours  pantoprazole    Tablet 40 milliGRAM(s) Oral before breakfast    MEDICATIONS (PRN):ALPRAZolam 0.25 milliGRAM(s) Oral every 6 hours PRN anxiety  diphenhydrAMINE   Capsule 25 milliGRAM(s) Oral every 4 hours PRN Rash and/or Itching  ondansetron Injectable 4 milliGRAM(s) IV Push every 4 hours PRN Nausea and/or Vomiting  oxyCODONE    IR 5 milliGRAM(s) Oral every 4 hours PRN Moderate Pain (4 - 6)  oxyCODONE    IR 10 milliGRAM(s) Oral every 4 hours PRN Severe Pain (7 - 10)

## 2017-11-18 VITALS
HEART RATE: 82 BPM | SYSTOLIC BLOOD PRESSURE: 114 MMHG | TEMPERATURE: 98 F | DIASTOLIC BLOOD PRESSURE: 69 MMHG | RESPIRATION RATE: 18 BRPM | OXYGEN SATURATION: 100 %

## 2017-11-18 RX ORDER — OXYCODONE HYDROCHLORIDE 5 MG/1
1 TABLET ORAL
Qty: 0 | Refills: 0 | COMMUNITY
Start: 2017-11-18

## 2017-11-18 RX ORDER — ONDANSETRON 8 MG/1
1 TABLET, FILM COATED ORAL
Qty: 10 | Refills: 0 | OUTPATIENT
Start: 2017-11-18

## 2017-11-18 RX ORDER — ACETAMINOPHEN 500 MG
3 TABLET ORAL
Qty: 0 | Refills: 0 | COMMUNITY
Start: 2017-11-18

## 2017-11-18 RX ORDER — IBUPROFEN 200 MG
1 TABLET ORAL
Qty: 0 | Refills: 0 | COMMUNITY
Start: 2017-11-18

## 2017-11-18 RX ORDER — IBUPROFEN 200 MG
1 TABLET ORAL
Qty: 120 | Refills: 0 | OUTPATIENT
Start: 2017-11-18 | End: 2017-12-18

## 2017-11-18 RX ADMIN — Medication 975 MILLIGRAM(S): at 06:22

## 2017-11-18 RX ADMIN — HEPARIN SODIUM 5000 UNIT(S): 5000 INJECTION INTRAVENOUS; SUBCUTANEOUS at 07:52

## 2017-11-18 RX ADMIN — Medication 600 MILLIGRAM(S): at 06:22

## 2017-11-18 RX ADMIN — Medication 100 MILLIGRAM(S): at 06:22

## 2017-11-18 RX ADMIN — Medication 0.25 MILLIGRAM(S): at 00:26

## 2017-11-18 RX ADMIN — OXYCODONE HYDROCHLORIDE 5 MILLIGRAM(S): 5 TABLET ORAL at 09:28

## 2017-11-18 RX ADMIN — PANTOPRAZOLE SODIUM 40 MILLIGRAM(S): 20 TABLET, DELAYED RELEASE ORAL at 06:22

## 2017-11-18 RX ADMIN — OXYCODONE HYDROCHLORIDE 5 MILLIGRAM(S): 5 TABLET ORAL at 10:00

## 2017-11-18 NOTE — PROGRESS NOTE ADULT - ATTENDING COMMENTS
The patient is doing well.  She denies CP, sob, calf pain, fevers.   On exam, both breasts are soft.  Skin paddles have good color and cap refill.  Abdomen soft    Discharge today.   Sleep on back. Daily ASA  encourage hydration  f/u Monday  all discharge instructions reviewed in detail

## 2017-11-18 NOTE — PROGRESS NOTE ADULT - ASSESSMENT
A/P 52F s/p VALENTINE flap to b/l breasts after mastectomy c/b RTOR POD0 for kinked vessel (POD 4)  - Ambulate  - Regular diet  - C/w Ancef, SQH, ibuprofen  - q4 flap checks  - D/c home today  - [facility]

## 2017-11-18 NOTE — PROGRESS NOTE ADULT - SUBJECTIVE AND OBJECTIVE BOX
Plastic Surgery Progress Note (pg [facility]: [id number], [facility]: [phone])    SUBJECTIVE:  Doing well. No overnight events. Pain well controlled, ambulating, tolerating regular diet, ready for d/c today.    OBJECTIVE:     ** VITAL SIGNS / I&O's **    Vital Signs Last 24 Hrs  T(C): 36.6 (18 Nov 2017 06:25), Max: 37 (17 Nov 2017 13:59)  T(F): 97.8 (18 Nov 2017 06:25), Max: 98.6 (17 Nov 2017 13:59)  HR: 72 (18 Nov 2017 06:25) (72 - 96)  BP: 108/68 (18 Nov 2017 06:25) (107/64 - 125/72)  BP(mean): --  RR: 18 (18 Nov 2017 06:25) (16 - 18)  SpO2: 98% (18 Nov 2017 06:25) (94% - 98%)      16 Nov 2017 07:01  -  17 Nov 2017 07:00  --------------------------------------------------------  IN:    lactated ringers.: 75 mL  Total IN: 75 mL    OUT:    Bulb: 93.5 mL    Bulb: 6.5 mL    Bulb: 52 mL    Bulb: 36.5 mL    Bulb: 20.5 mL    Bulb: 17.5 mL    Indwelling Catheter - Urethral: 2300 mL  Total OUT: 2526.5 mL    Total NET: -2451.5 mL      17 Nov 2017 07:01  -  18 Nov 2017 06:29  --------------------------------------------------------  IN:  Total IN: 0 mL    OUT:    Bulb: 37.5 mL    Bulb: 37.5 mL    Bulb: 12.5 mL    Bulb: 25 mL    Bulb: 2.5 mL    Bulb: 45 mL    Indwelling Catheter - Urethral: 700 mL    Voided: 450 mL  Total OUT: 1310 mL    Total NET: -1310 mL          ** PHYSICAL EXAM **    -- CONSTITUTIONAL: AOx3. NAD.   -- CHEST: b/l breast flaps pink, soft, viable, CRT 2 seconds; incisions c/d/i, no palpable collections, JPs serosanguinous  -- ABDOMEN: soft, incision c/d/i, no collections, JPs serosanguinous

## 2017-11-20 ENCOUNTER — APPOINTMENT (OUTPATIENT)
Dept: PLASTIC SURGERY | Facility: CLINIC | Age: 52
End: 2017-11-20
Payer: COMMERCIAL

## 2017-11-20 PROCEDURE — 99024 POSTOP FOLLOW-UP VISIT: CPT

## 2017-11-22 ENCOUNTER — APPOINTMENT (OUTPATIENT)
Dept: PLASTIC SURGERY | Facility: CLINIC | Age: 52
End: 2017-11-22
Payer: COMMERCIAL

## 2017-11-22 PROCEDURE — 99024 POSTOP FOLLOW-UP VISIT: CPT

## 2017-11-27 ENCOUNTER — APPOINTMENT (OUTPATIENT)
Dept: PLASTIC SURGERY | Facility: CLINIC | Age: 52
End: 2017-11-27
Payer: COMMERCIAL

## 2017-11-27 PROCEDURE — 99024 POSTOP FOLLOW-UP VISIT: CPT

## 2017-12-04 ENCOUNTER — APPOINTMENT (OUTPATIENT)
Dept: PLASTIC SURGERY | Facility: CLINIC | Age: 52
End: 2017-12-04
Payer: COMMERCIAL

## 2017-12-04 PROCEDURE — 99024 POSTOP FOLLOW-UP VISIT: CPT

## 2017-12-04 RX ORDER — OXYCODONE AND ACETAMINOPHEN 5; 325 MG/1; MG/1
5-325 TABLET ORAL
Qty: 16 | Refills: 0 | Status: ACTIVE | COMMUNITY
Start: 2017-12-04 | End: 1900-01-01

## 2017-12-15 ENCOUNTER — RX RENEWAL (OUTPATIENT)
Age: 52
End: 2017-12-15

## 2017-12-15 RX ORDER — OXYCODONE AND ACETAMINOPHEN 5; 325 MG/1; MG/1
5-325 TABLET ORAL
Qty: 16 | Refills: 0 | Status: ACTIVE | COMMUNITY
Start: 2017-12-15 | End: 1900-01-01

## 2018-01-11 ENCOUNTER — APPOINTMENT (OUTPATIENT)
Dept: PLASTIC SURGERY | Facility: CLINIC | Age: 53
End: 2018-01-11
Payer: COMMERCIAL

## 2018-01-11 PROCEDURE — 99024 POSTOP FOLLOW-UP VISIT: CPT

## 2018-01-11 PROCEDURE — 99214 OFFICE O/P EST MOD 30 MIN: CPT

## 2018-02-16 ENCOUNTER — OUTPATIENT (OUTPATIENT)
Dept: OUTPATIENT SERVICES | Facility: HOSPITAL | Age: 53
LOS: 1 days | End: 2018-02-16

## 2018-02-16 VITALS
DIASTOLIC BLOOD PRESSURE: 76 MMHG | TEMPERATURE: 98 F | RESPIRATION RATE: 18 BRPM | HEIGHT: 63 IN | HEART RATE: 68 BPM | WEIGHT: 147.05 LBS | SYSTOLIC BLOOD PRESSURE: 120 MMHG

## 2018-02-16 DIAGNOSIS — Z98.890 OTHER SPECIFIED POSTPROCEDURAL STATES: Chronic | ICD-10-CM

## 2018-02-16 DIAGNOSIS — Z85.3 PERSONAL HISTORY OF MALIGNANT NEOPLASM OF BREAST: ICD-10-CM

## 2018-02-16 DIAGNOSIS — C50.919 MALIGNANT NEOPLASM OF UNSPECIFIED SITE OF UNSPECIFIED FEMALE BREAST: ICD-10-CM

## 2018-02-16 DIAGNOSIS — E07.9 DISORDER OF THYROID, UNSPECIFIED: Chronic | ICD-10-CM

## 2018-02-16 DIAGNOSIS — D36.10 BENIGN NEOPLASM OF PERIPHERAL NERVES AND AUTONOMIC NERVOUS SYSTEM, UNSPECIFIED: Chronic | ICD-10-CM

## 2018-02-16 LAB
BUN SERPL-MCNC: 22 MG/DL — SIGNIFICANT CHANGE UP (ref 7–23)
CALCIUM SERPL-MCNC: 9.4 MG/DL — SIGNIFICANT CHANGE UP (ref 8.4–10.5)
CHLORIDE SERPL-SCNC: 103 MMOL/L — SIGNIFICANT CHANGE UP (ref 98–107)
CO2 SERPL-SCNC: 25 MMOL/L — SIGNIFICANT CHANGE UP (ref 22–31)
CREAT SERPL-MCNC: 0.87 MG/DL — SIGNIFICANT CHANGE UP (ref 0.5–1.3)
GLUCOSE SERPL-MCNC: 89 MG/DL — SIGNIFICANT CHANGE UP (ref 70–99)
HCG SERPL-ACNC: < 5 MIU/ML — SIGNIFICANT CHANGE UP
HCT VFR BLD CALC: 32.6 % — LOW (ref 34.5–45)
HGB BLD-MCNC: 10.2 G/DL — LOW (ref 11.5–15.5)
MCHC RBC-ENTMCNC: 25.8 PG — LOW (ref 27–34)
MCHC RBC-ENTMCNC: 31.3 % — LOW (ref 32–36)
MCV RBC AUTO: 82.3 FL — SIGNIFICANT CHANGE UP (ref 80–100)
NRBC # FLD: 0 — SIGNIFICANT CHANGE UP
PLATELET # BLD AUTO: 305 K/UL — SIGNIFICANT CHANGE UP (ref 150–400)
PMV BLD: 9.5 FL — SIGNIFICANT CHANGE UP (ref 7–13)
POTASSIUM SERPL-MCNC: 4 MMOL/L — SIGNIFICANT CHANGE UP (ref 3.5–5.3)
POTASSIUM SERPL-SCNC: 4 MMOL/L — SIGNIFICANT CHANGE UP (ref 3.5–5.3)
RBC # BLD: 3.96 M/UL — SIGNIFICANT CHANGE UP (ref 3.8–5.2)
RBC # FLD: 14.5 % — SIGNIFICANT CHANGE UP (ref 10.3–14.5)
SODIUM SERPL-SCNC: 142 MMOL/L — SIGNIFICANT CHANGE UP (ref 135–145)
WBC # BLD: 6.76 K/UL — SIGNIFICANT CHANGE UP (ref 3.8–10.5)
WBC # FLD AUTO: 6.76 K/UL — SIGNIFICANT CHANGE UP (ref 3.8–10.5)

## 2018-02-16 NOTE — H&P PST ADULT - NSANTHOSAYNRD_GEN_A_CORE
No. MADYSON screening performed.  STOP BANG Legend: 0-2 = LOW Risk; 3-4 = INTERMEDIATE Risk; 5-8 = HIGH Risk never tested/No. MADYSON screening performed.  STOP BANG Legend: 0-2 = LOW Risk; 3-4 = INTERMEDIATE Risk; 5-8 = HIGH Risk

## 2018-02-16 NOTE — H&P PST ADULT - ASSESSMENT
53 y/o female with right breast malignant neoplasm discovered on recent imaging. Pt had Bilateral total mastectomies, bilateral sentinel lymph node biopsy, possible  lymph node dissection, Bilateral Breast Reconstruction with Deep Inferior Epigastric Artery  Flaps  11/14/17. Pt presents for preop eval to have revision of bilat breast reconstruction and abdominal donor site, trunk liposuction with breast fat grafting bilat nipple reconstructions on 2/23/18.

## 2018-02-16 NOTE — H&P PST ADULT - PSH
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

## 2018-02-16 NOTE — H&P PST ADULT - NEUROLOGICAL DETAILS
cranial nerves intact/responds to verbal commands/normal strength/responds to pain/sensation intact/alert and oriented x 3

## 2018-02-16 NOTE — H&P PST ADULT - SKIN/BREAST COMMENTS
Right breast malignant neoplasm discovered and had Bilateral total mastectomies, right sentinel lymph node biopsy, possible  lymph node dissection, Bilateral Breast Reconstruction with VALENTINE Inferior Epigastric Artery  Flaps  11/14/17.

## 2018-02-16 NOTE — H&P PST ADULT - PROBLEM SELECTOR PLAN 1
Bilateral total mastectomies, bilateral sentinel lymph node biopsy, possible  lymph node dissection, Bilateral Breast Reconstruction with Deep Inferior Epigastric Artery  Flaps  11/14/17.      CBC CMP T&S CXR    antibacterial soap given and explained. Pre-op instructions given and explained Scheduled to have  to have revision of bilat breast reconstruction and abdominal donor site, trunk liposuction with breast fat grafting bilat nipple reconstructions on 2/23/18.  Labs pending,  antibacterial soap given and explained. Pre-op instructions given and explained

## 2018-02-16 NOTE — H&P PST ADULT - RS GEN PE MLT RESP DETAILS PC
airway patent/no wheezes/clear to auscultation bilaterally/respirations non-labored/no rhonchi/no rales

## 2018-03-05 ENCOUNTER — RX RENEWAL (OUTPATIENT)
Age: 53
End: 2018-03-05

## 2018-03-05 RX ORDER — OXYCODONE AND ACETAMINOPHEN 5; 325 MG/1; MG/1
5-325 TABLET ORAL
Qty: 20 | Refills: 0 | Status: ACTIVE | COMMUNITY
Start: 2018-03-05 | End: 1900-01-01

## 2018-03-06 ENCOUNTER — OUTPATIENT (OUTPATIENT)
Dept: OUTPATIENT SERVICES | Facility: HOSPITAL | Age: 53
LOS: 1 days | Discharge: HOME | End: 2018-03-06
Payer: COMMERCIAL

## 2018-03-06 VITALS
RESPIRATION RATE: 16 BRPM | HEART RATE: 62 BPM | TEMPERATURE: 98 F | OXYGEN SATURATION: 98 % | DIASTOLIC BLOOD PRESSURE: 68 MMHG | SYSTOLIC BLOOD PRESSURE: 129 MMHG

## 2018-03-06 VITALS
RESPIRATION RATE: 15 BRPM | HEART RATE: 64 BPM | TEMPERATURE: 98 F | SYSTOLIC BLOOD PRESSURE: 117 MMHG | DIASTOLIC BLOOD PRESSURE: 70 MMHG | WEIGHT: 147.05 LBS | OXYGEN SATURATION: 97 % | HEIGHT: 63 IN

## 2018-03-06 DIAGNOSIS — E07.9 DISORDER OF THYROID, UNSPECIFIED: Chronic | ICD-10-CM

## 2018-03-06 DIAGNOSIS — D36.10 BENIGN NEOPLASM OF PERIPHERAL NERVES AND AUTONOMIC NERVOUS SYSTEM, UNSPECIFIED: Chronic | ICD-10-CM

## 2018-03-06 DIAGNOSIS — Z98.890 OTHER SPECIFIED POSTPROCEDURAL STATES: Chronic | ICD-10-CM

## 2018-03-06 DIAGNOSIS — N65.0 DEFORMITY OF RECONSTRUCTED BREAST: ICD-10-CM

## 2018-03-06 LAB
BLD GP AB SCN SERPL QL: NEGATIVE — SIGNIFICANT CHANGE UP
RH IG SCN BLD-IMP: POSITIVE — SIGNIFICANT CHANGE UP

## 2018-03-06 PROCEDURE — 19350 NIPPLE/AREOLA RECONSTRUCTION: CPT | Mod: RT

## 2018-03-06 PROCEDURE — 13101 CMPLX RPR TRUNK 2.6-7.5 CM: CPT | Mod: 59

## 2018-03-06 PROCEDURE — 19380 REVJ RECONSTRUCTED BREAST: CPT | Mod: LT

## 2018-03-06 PROCEDURE — 15877 SUCTION LIPECTOMY TRUNK: CPT

## 2018-03-06 RX ORDER — SODIUM CHLORIDE 9 MG/ML
1000 INJECTION, SOLUTION INTRAVENOUS
Qty: 0 | Refills: 0 | Status: DISCONTINUED | OUTPATIENT
Start: 2018-03-06 | End: 2018-03-07

## 2018-03-06 NOTE — ASU DISCHARGE PLAN (ADULT/PEDIATRIC). - NURSING INSTRUCTIONS
You were given IV Tylenol for pain management in the Operating Room.  Please do NOT take tylenol/acetaminophen products for the next 6-8 hours (after 1045PM ).  Refer to medication reconcillation sheet attached with discharge instruction paperwork.

## 2018-03-06 NOTE — BRIEF OPERATIVE NOTE - OPERATION/FINDINGS
Revision of b/l breast reconstruction, b/L NAC reconstruction, AFG, b/l  abdominal donor site revision

## 2018-03-06 NOTE — ASU DISCHARGE PLAN (ADULT/PEDIATRIC). - MEDICATION SUMMARY - MEDICATIONS TO TAKE
I will START or STAY ON the medications listed below when I get home from the hospital:    centrum nail and body  -- 1 tab oral daily.  last dose 11/8/17  -- Indication: For home med    acetaminophen 325 mg oral tablet  -- 3 tab(s) by mouth every 8 hours  -- Indication: For pain    aspirin 81 mg oral tablet  -- 1 tab(s) by mouth once a day  -- Indication: For home med    oxyCODONE 5 mg oral tablet  -- 1 tab(s) by mouth every 4 hours, As needed, Moderate Pain (4 - 6)  -- Indication: For pain    oxyCODONE 10 mg oral tablet  -- 1 tab(s) by mouth every 4 hours, As needed, Severe Pain (7 - 10)  -- Indication: For pain    ibuprofen 600 mg oral tablet  -- 1 tab(s) by mouth every 6 hours  -- Indication: For pain    Zofran ODT 4 mg oral tablet, disintegrating  -- 1 tab(s) by mouth 3 times a day   -- Indication: For home med    Xanax 0.5 mg oral tablet  -- 1 tab(s) by mouth once a day, As Needed takes 1/2 tablet at night  -- Indication: For home med    CoQ10 300 mg oral capsule  -- 1 cap(s) by mouth once a day last dose on 11/8/17  -- Indication: For home med    omeprazole 40 mg oral delayed release capsule  -- 1 cap(s) by mouth once a day in am  -- Indication: For home med    Centrum oral tablet  -- 1 tab(s) by mouth once a day n am last dose on 11/8/17  -- Indication: For home med    Vitamin B12 100 mcg oral tablet  -- 1 tab(s) by mouth once a day  -- Indication: For home med    Vitamin B6 100 mg oral tablet  -- 1 tab(s) by mouth once a day in am  -- Indication: For home med    Vitamin D3 2000 intl units oral capsule  -- 1 cap(s) by mouth once a day  -- Indication: For home med

## 2018-03-06 NOTE — ASU DISCHARGE PLAN (ADULT/PEDIATRIC). - NOTIFY
Fever greater than 101/Persistent Nausea and Vomiting/Bleeding that does not stop/Swelling that continues Fever greater than 101/Signs of infection: redness around surgical site, hot and tender to the touch, pus drainage of any kind accompanied by foul odor/Swelling that continues/Bleeding that does not stop/Pain not relieved by Medications/Persistent Nausea and Vomiting

## 2018-03-07 ENCOUNTER — TRANSCRIPTION ENCOUNTER (OUTPATIENT)
Age: 53
End: 2018-03-07

## 2018-03-12 ENCOUNTER — APPOINTMENT (OUTPATIENT)
Dept: PLASTIC SURGERY | Facility: CLINIC | Age: 53
End: 2018-03-12
Payer: COMMERCIAL

## 2018-03-12 PROCEDURE — 99024 POSTOP FOLLOW-UP VISIT: CPT

## 2018-03-26 ENCOUNTER — APPOINTMENT (OUTPATIENT)
Dept: PLASTIC SURGERY | Facility: CLINIC | Age: 53
End: 2018-03-26
Payer: COMMERCIAL

## 2018-03-26 PROCEDURE — 99024 POSTOP FOLLOW-UP VISIT: CPT

## 2018-04-26 ENCOUNTER — APPOINTMENT (OUTPATIENT)
Dept: PLASTIC SURGERY | Facility: CLINIC | Age: 53
End: 2018-04-26
Payer: COMMERCIAL

## 2018-04-26 PROCEDURE — 99024 POSTOP FOLLOW-UP VISIT: CPT

## 2018-05-21 ENCOUNTER — APPOINTMENT (OUTPATIENT)
Dept: PLASTIC SURGERY | Facility: CLINIC | Age: 53
End: 2018-05-21
Payer: COMMERCIAL

## 2018-05-21 PROCEDURE — 99024 POSTOP FOLLOW-UP VISIT: CPT

## 2018-06-06 ENCOUNTER — OUTPATIENT (OUTPATIENT)
Dept: OUTPATIENT SERVICES | Facility: HOSPITAL | Age: 53
LOS: 1 days | End: 2018-06-06

## 2018-06-06 VITALS
DIASTOLIC BLOOD PRESSURE: 80 MMHG | WEIGHT: 154.1 LBS | SYSTOLIC BLOOD PRESSURE: 110 MMHG | HEART RATE: 65 BPM | OXYGEN SATURATION: 97 % | RESPIRATION RATE: 15 BRPM | HEIGHT: 62.5 IN | TEMPERATURE: 98 F

## 2018-06-06 DIAGNOSIS — Z98.890 OTHER SPECIFIED POSTPROCEDURAL STATES: Chronic | ICD-10-CM

## 2018-06-06 DIAGNOSIS — C50.919 MALIGNANT NEOPLASM OF UNSPECIFIED SITE OF UNSPECIFIED FEMALE BREAST: ICD-10-CM

## 2018-06-06 DIAGNOSIS — Z42.1 ENCOUNTER FOR BREAST RECONSTRUCTION FOLLOWING MASTECTOMY: ICD-10-CM

## 2018-06-06 DIAGNOSIS — E07.9 DISORDER OF THYROID, UNSPECIFIED: Chronic | ICD-10-CM

## 2018-06-06 DIAGNOSIS — D36.10 BENIGN NEOPLASM OF PERIPHERAL NERVES AND AUTONOMIC NERVOUS SYSTEM, UNSPECIFIED: Chronic | ICD-10-CM

## 2018-06-06 LAB
BUN SERPL-MCNC: 19 MG/DL — SIGNIFICANT CHANGE UP (ref 7–23)
CALCIUM SERPL-MCNC: 9.6 MG/DL — SIGNIFICANT CHANGE UP (ref 8.4–10.5)
CHLORIDE SERPL-SCNC: 99 MMOL/L — SIGNIFICANT CHANGE UP (ref 98–107)
CO2 SERPL-SCNC: 27 MMOL/L — SIGNIFICANT CHANGE UP (ref 22–31)
CREAT SERPL-MCNC: 0.79 MG/DL — SIGNIFICANT CHANGE UP (ref 0.5–1.3)
GLUCOSE SERPL-MCNC: 69 MG/DL — LOW (ref 70–99)
HCG SERPL-ACNC: < 5 MIU/ML — SIGNIFICANT CHANGE UP
HCT VFR BLD CALC: 38.5 % — SIGNIFICANT CHANGE UP (ref 34.5–45)
HGB BLD-MCNC: 12 G/DL — SIGNIFICANT CHANGE UP (ref 11.5–15.5)
MCHC RBC-ENTMCNC: 27.3 PG — SIGNIFICANT CHANGE UP (ref 27–34)
MCHC RBC-ENTMCNC: 31.2 % — LOW (ref 32–36)
MCV RBC AUTO: 87.7 FL — SIGNIFICANT CHANGE UP (ref 80–100)
NRBC # FLD: 0 — SIGNIFICANT CHANGE UP
PLATELET # BLD AUTO: 284 K/UL — SIGNIFICANT CHANGE UP (ref 150–400)
PMV BLD: 9.5 FL — SIGNIFICANT CHANGE UP (ref 7–13)
POTASSIUM SERPL-MCNC: 4.1 MMOL/L — SIGNIFICANT CHANGE UP (ref 3.5–5.3)
POTASSIUM SERPL-SCNC: 4.1 MMOL/L — SIGNIFICANT CHANGE UP (ref 3.5–5.3)
RBC # BLD: 4.39 M/UL — SIGNIFICANT CHANGE UP (ref 3.8–5.2)
RBC # FLD: 14.6 % — HIGH (ref 10.3–14.5)
SODIUM SERPL-SCNC: 139 MMOL/L — SIGNIFICANT CHANGE UP (ref 135–145)
WBC # BLD: 6.85 K/UL — SIGNIFICANT CHANGE UP (ref 3.8–10.5)
WBC # FLD AUTO: 6.85 K/UL — SIGNIFICANT CHANGE UP (ref 3.8–10.5)

## 2018-06-06 RX ORDER — PYRIDOXINE HCL (VITAMIN B6) 100 MG
1 TABLET ORAL
Qty: 0 | Refills: 0 | COMMUNITY

## 2018-06-06 RX ORDER — OMEPRAZOLE 10 MG/1
1 CAPSULE, DELAYED RELEASE ORAL
Qty: 0 | Refills: 0 | COMMUNITY

## 2018-06-06 RX ORDER — ALPRAZOLAM 0.25 MG
1 TABLET ORAL
Qty: 0 | Refills: 0 | COMMUNITY

## 2018-06-06 RX ORDER — UBIDECARENONE 100 MG
1 CAPSULE ORAL
Qty: 0 | Refills: 0 | COMMUNITY

## 2018-06-06 RX ORDER — CHOLECALCIFEROL (VITAMIN D3) 125 MCG
1 CAPSULE ORAL
Qty: 0 | Refills: 0 | COMMUNITY

## 2018-06-06 RX ORDER — PREGABALIN 225 MG/1
1 CAPSULE ORAL
Qty: 0 | Refills: 0 | COMMUNITY

## 2018-06-06 RX ORDER — ASPIRIN/CALCIUM CARB/MAGNESIUM 324 MG
1 TABLET ORAL
Qty: 0 | Refills: 0 | COMMUNITY

## 2018-06-06 RX ORDER — MULTIVIT-MIN/FERROUS GLUCONATE 9 MG/15 ML
1 LIQUID (ML) ORAL
Qty: 0 | Refills: 0 | COMMUNITY

## 2018-06-06 NOTE — H&P PST ADULT - ASSESSMENT
52 y.o female with preop diangosis of ... 52 y.o female with preop diagnosis of encounter for breast reconstruction following mastectomy, personal history of malignant neoplasm of breast, acquired absence of bilateral breasts and nipples

## 2018-06-06 NOTE — H&P PST ADULT - PROBLEM SELECTOR PLAN 1
pt scheduled for revision of right breast reconstruction and abdominal donor site with trunk liposuction on 06/12/18  Preop instructions provided. Pt verbalized understanding.   pt to take own PPI for GI prophylaxis  Chlorhexidine wash with instructions provided.   last heme/oncology note requested

## 2018-06-06 NOTE — H&P PST ADULT - REASON FOR ADMISSION
" I had bilat mastectomy and had reconstructions but now going for reconstruction repair" encounter for breast reconstruction following mastectomy

## 2018-06-06 NOTE — H&P PST ADULT - HISTORY OF PRESENT ILLNESS
52 y.o. female with hx of right breast cancer, s/p bilateral mastectomy and reconstruction surgery in 10/2017, nipple reconstructions on 02/23/18, pt presents to PST for evaluation for Revision o Right Breast Reconstruction and Abdominal Donor Site with Trunk Liposuction on 06/12/18 52 y.o. female with hx of right breast cancer, s/p bilateral mastectomy and reconstruction surgery in 10/2017, nipple reconstructions on 02/23/18, c/o breasts asymmetry, presents to PST for evaluation for Revision of Right Breast Reconstruction and Abdominal Donor Site with Trunk Liposuction on 06/12/18

## 2018-06-06 NOTE — H&P PST ADULT - NSANTHOSAYNRD_GEN_A_CORE
never tested/No. MADYSON screening performed.  STOP BANG Legend: 0-2 = LOW Risk; 3-4 = INTERMEDIATE Risk; 5-8 = HIGH Risk

## 2018-06-06 NOTE — H&P PST ADULT - NEGATIVE GENERAL GENITOURINARY SYMPTOMS
no flank pain L/no flank pain R/no incontinence/no renal colic/normal urinary frequency/no bladder infections

## 2018-06-06 NOTE — H&P PST ADULT - FAMILY HISTORY
Family history of breast cancer, cousin     Mother  Still living? No  Family history of diabetes mellitus, Age at diagnosis: Age Unknown     Father  Still living? No  Family history of heart disease, Age at diagnosis: Age Unknown

## 2018-06-06 NOTE — H&P PST ADULT - NEGATIVE ENMT SYMPTOMS
no nasal discharge/no post-nasal discharge/no sinus symptoms/no ear pain/no hearing difficulty/no nasal congestion/no throat pain/no dysphagia

## 2018-06-06 NOTE — H&P PST ADULT - PSH
Benign neuroma  neuroma removal from left hand - many yrs ago  Disorder of thyroid  s/p thyroid nodule removal - 24 yrs ago  H/O breast biopsy  10/11/17 right breast  H/O mastectomy, bilateral  and VALENTINE flap reconstruction on  Nov 2017; revision surgery in 02/2018

## 2018-06-06 NOTE — H&P PST ADULT - NEGATIVE MUSCULOSKELETAL SYMPTOMS
no joint swelling/no myalgia/no stiffness/no neck pain/no arthritis/no muscle cramps/no muscle weakness/no back pain

## 2018-06-11 ENCOUNTER — TRANSCRIPTION ENCOUNTER (OUTPATIENT)
Age: 53
End: 2018-06-11

## 2018-06-11 ENCOUNTER — RX RENEWAL (OUTPATIENT)
Age: 53
End: 2018-06-11

## 2018-06-11 RX ORDER — OXYCODONE AND ACETAMINOPHEN 5; 325 MG/1; MG/1
5-325 TABLET ORAL
Qty: 25 | Refills: 0 | Status: ACTIVE | COMMUNITY
Start: 2018-06-11 | End: 1900-01-01

## 2018-06-12 ENCOUNTER — OUTPATIENT (OUTPATIENT)
Dept: OUTPATIENT SERVICES | Facility: HOSPITAL | Age: 53
LOS: 1 days | Discharge: HOME | End: 2018-06-12
Payer: COMMERCIAL

## 2018-06-12 VITALS
OXYGEN SATURATION: 100 % | RESPIRATION RATE: 20 BRPM | SYSTOLIC BLOOD PRESSURE: 132 MMHG | HEART RATE: 56 BPM | DIASTOLIC BLOOD PRESSURE: 73 MMHG

## 2018-06-12 VITALS
RESPIRATION RATE: 15 BRPM | WEIGHT: 154.1 LBS | OXYGEN SATURATION: 99 % | HEART RATE: 58 BPM | SYSTOLIC BLOOD PRESSURE: 111 MMHG | TEMPERATURE: 98 F | HEIGHT: 62.5 IN | DIASTOLIC BLOOD PRESSURE: 66 MMHG

## 2018-06-12 DIAGNOSIS — Z42.1 ENCOUNTER FOR BREAST RECONSTRUCTION FOLLOWING MASTECTOMY: ICD-10-CM

## 2018-06-12 DIAGNOSIS — Z98.890 OTHER SPECIFIED POSTPROCEDURAL STATES: Chronic | ICD-10-CM

## 2018-06-12 DIAGNOSIS — D36.10 BENIGN NEOPLASM OF PERIPHERAL NERVES AND AUTONOMIC NERVOUS SYSTEM, UNSPECIFIED: Chronic | ICD-10-CM

## 2018-06-12 DIAGNOSIS — E07.9 DISORDER OF THYROID, UNSPECIFIED: Chronic | ICD-10-CM

## 2018-06-12 PROCEDURE — 15877 SUCTION LIPECTOMY TRUNK: CPT

## 2018-06-12 NOTE — ASU DISCHARGE PLAN (ADULT/PEDIATRIC). - MEDICATION SUMMARY - MEDICATIONS TO TAKE
I will START or STAY ON the medications listed below when I get home from the hospital:    see medication reconciliation  -- Indication: For See med rec in chart

## 2018-06-12 NOTE — ASU DISCHARGE PLAN (ADULT/PEDIATRIC). - NOTIFY
Fever greater than 101/Swelling that continues/Pain not relieved by Medications/Bleeding that does not stop

## 2018-06-12 NOTE — ASU DISCHARGE PLAN (ADULT/PEDIATRIC). - NURSING INSTRUCTIONS
Keep dressings on breast and hip until seen by doctor.  Take pain medication if needed for pain. Call and make follow up appointment for doctor for next week.  Call doctor if you have any questions or problems.   Resume normal diet as tolerated. Increase fluids.

## 2018-06-12 NOTE — BRIEF OPERATIVE NOTE - PROCEDURE
<<-----Click on this checkbox to enter Procedure Revision of reconstruction of breast  06/12/2018    Active  TEJA

## 2018-06-18 ENCOUNTER — APPOINTMENT (OUTPATIENT)
Dept: PLASTIC SURGERY | Facility: CLINIC | Age: 53
End: 2018-06-18
Payer: COMMERCIAL

## 2018-06-18 PROCEDURE — 99024 POSTOP FOLLOW-UP VISIT: CPT

## 2018-06-25 ENCOUNTER — APPOINTMENT (OUTPATIENT)
Dept: PLASTIC SURGERY | Facility: CLINIC | Age: 53
End: 2018-06-25
Payer: COMMERCIAL

## 2018-06-25 PROCEDURE — 99024 POSTOP FOLLOW-UP VISIT: CPT

## 2018-06-27 ENCOUNTER — APPOINTMENT (OUTPATIENT)
Dept: MRI IMAGING | Facility: CLINIC | Age: 53
End: 2018-06-27

## 2018-06-27 ENCOUNTER — OUTPATIENT (OUTPATIENT)
Dept: OUTPATIENT SERVICES | Facility: HOSPITAL | Age: 53
LOS: 1 days | End: 2018-06-27
Payer: COMMERCIAL

## 2018-06-27 DIAGNOSIS — Z00.8 ENCOUNTER FOR OTHER GENERAL EXAMINATION: ICD-10-CM

## 2018-06-27 DIAGNOSIS — E07.9 DISORDER OF THYROID, UNSPECIFIED: Chronic | ICD-10-CM

## 2018-06-27 DIAGNOSIS — D36.10 BENIGN NEOPLASM OF PERIPHERAL NERVES AND AUTONOMIC NERVOUS SYSTEM, UNSPECIFIED: Chronic | ICD-10-CM

## 2018-06-27 DIAGNOSIS — Z98.890 OTHER SPECIFIED POSTPROCEDURAL STATES: Chronic | ICD-10-CM

## 2018-06-27 PROCEDURE — 70553 MRI BRAIN STEM W/O & W/DYE: CPT

## 2018-06-27 PROCEDURE — A9585: CPT

## 2018-06-27 PROCEDURE — 70553 MRI BRAIN STEM W/O & W/DYE: CPT | Mod: 26

## 2018-07-16 ENCOUNTER — APPOINTMENT (OUTPATIENT)
Dept: PLASTIC SURGERY | Facility: CLINIC | Age: 53
End: 2018-07-16
Payer: COMMERCIAL

## 2018-07-16 DIAGNOSIS — D05.11 INTRADUCTAL CARCINOMA IN SITU OF RIGHT BREAST: ICD-10-CM

## 2018-07-16 PROBLEM — C50.919 MALIGNANT NEOPLASM OF UNSPECIFIED SITE OF UNSPECIFIED FEMALE BREAST: Chronic | Status: ACTIVE | Noted: 2017-11-08

## 2018-07-16 PROBLEM — K21.9 GASTRO-ESOPHAGEAL REFLUX DISEASE WITHOUT ESOPHAGITIS: Chronic | Status: ACTIVE | Noted: 2017-10-10

## 2018-07-16 PROCEDURE — 99024 POSTOP FOLLOW-UP VISIT: CPT

## 2018-07-27 PROBLEM — Z78.9 ALCOHOL USE: Status: ACTIVE | Noted: 2017-10-30

## 2018-08-01 ENCOUNTER — APPOINTMENT (OUTPATIENT)
Dept: PLASTIC SURGERY | Facility: CLINIC | Age: 53
End: 2018-08-01
Payer: COMMERCIAL

## 2018-08-01 PROCEDURE — 19350 NIPPLE/AREOLA RECONSTRUCTION: CPT | Mod: RT

## 2018-08-20 ENCOUNTER — APPOINTMENT (OUTPATIENT)
Dept: PLASTIC SURGERY | Facility: CLINIC | Age: 53
End: 2018-08-20

## 2018-09-10 ENCOUNTER — APPOINTMENT (OUTPATIENT)
Dept: PLASTIC SURGERY | Facility: CLINIC | Age: 53
End: 2018-09-10
Payer: COMMERCIAL

## 2018-09-10 PROCEDURE — 99024 POSTOP FOLLOW-UP VISIT: CPT

## 2018-09-18 ENCOUNTER — APPOINTMENT (OUTPATIENT)
Dept: PLASTIC SURGERY | Facility: CLINIC | Age: 53
End: 2018-09-18
Payer: COMMERCIAL

## 2018-09-18 PROCEDURE — 99024 POSTOP FOLLOW-UP VISIT: CPT

## 2018-10-15 ENCOUNTER — APPOINTMENT (OUTPATIENT)
Dept: PLASTIC SURGERY | Facility: CLINIC | Age: 53
End: 2018-10-15
Payer: COMMERCIAL

## 2018-10-15 PROCEDURE — 99024 POSTOP FOLLOW-UP VISIT: CPT

## 2018-10-29 ENCOUNTER — APPOINTMENT (OUTPATIENT)
Dept: PLASTIC SURGERY | Facility: CLINIC | Age: 53
End: 2018-10-29
Payer: COMMERCIAL

## 2018-10-29 PROCEDURE — 99024 POSTOP FOLLOW-UP VISIT: CPT

## 2018-12-17 ENCOUNTER — APPOINTMENT (OUTPATIENT)
Dept: PLASTIC SURGERY | Facility: CLINIC | Age: 53
End: 2018-12-17
Payer: COMMERCIAL

## 2018-12-17 PROCEDURE — 99212 OFFICE O/P EST SF 10 MIN: CPT

## 2019-01-02 NOTE — H&P PST ADULT - SURGICAL SITE INCISION
I have personally performed a face to face diagnostic evaluation on this patient. I have reviewed the ACP note and agree with the history, exam and plan of care, except as noted. no

## 2019-03-27 NOTE — ASU PREOP CHECKLIST - NS PREOP CHK MONITOR ANESTHESIA CONSENT
-- Message is from the [facility]--    Reason for Call:Patient would like a call regarding skin rash or hives    Caller Information       Type Contact Phone    03/27/2019 05:19 PM Phone (Incoming) Jaskaran Peacock (Self) [phone] (M)          Alternative phone number: [phone]   Wife number     Turnaround time given to caller:   \"This message will be sent to [state Provider's name]. The clinical team will fulfill your request as soon as they review your message when the office opens tomorrow.\"     done

## 2019-04-24 ENCOUNTER — OUTPATIENT (OUTPATIENT)
Dept: OUTPATIENT SERVICES | Facility: HOSPITAL | Age: 54
LOS: 1 days | End: 2019-04-24
Payer: COMMERCIAL

## 2019-04-24 ENCOUNTER — APPOINTMENT (OUTPATIENT)
Dept: MRI IMAGING | Facility: CLINIC | Age: 54
End: 2019-04-24
Payer: COMMERCIAL

## 2019-04-24 DIAGNOSIS — Z98.890 OTHER SPECIFIED POSTPROCEDURAL STATES: Chronic | ICD-10-CM

## 2019-04-24 DIAGNOSIS — D36.10 BENIGN NEOPLASM OF PERIPHERAL NERVES AND AUTONOMIC NERVOUS SYSTEM, UNSPECIFIED: Chronic | ICD-10-CM

## 2019-04-24 DIAGNOSIS — E07.9 DISORDER OF THYROID, UNSPECIFIED: Chronic | ICD-10-CM

## 2019-04-24 DIAGNOSIS — Z00.8 ENCOUNTER FOR OTHER GENERAL EXAMINATION: ICD-10-CM

## 2019-04-24 PROCEDURE — 70551 MRI BRAIN STEM W/O DYE: CPT | Mod: 26

## 2019-04-24 PROCEDURE — 70551 MRI BRAIN STEM W/O DYE: CPT

## 2019-07-01 ENCOUNTER — APPOINTMENT (OUTPATIENT)
Dept: PLASTIC SURGERY | Facility: CLINIC | Age: 54
End: 2019-07-01
Payer: COMMERCIAL

## 2019-07-01 PROCEDURE — 99212 OFFICE O/P EST SF 10 MIN: CPT

## 2019-07-02 NOTE — REASON FOR VISIT
[Follow-Up: _____] : a [unfilled] follow-up visit [FreeTextEntry1] : s/p bilateral breast reconstruction with VALENTINE flaps, reconstruction revision, nipple/areola tattoo.

## 2019-11-14 NOTE — H&P PST ADULT - NSWEIGHTCALCTOOLDRUG_GEN_A_CORE
NURSE NOTES:

Temp of 100.3F. Cooling measures initiated. PRN Tylenol given. Feeding held, NPO midnight 
for scheduled tracheostomy in the morning. Will continue to monitor.  used

## 2019-12-30 ENCOUNTER — FORM ENCOUNTER (OUTPATIENT)
Age: 54
End: 2019-12-30

## 2019-12-30 ENCOUNTER — APPOINTMENT (OUTPATIENT)
Dept: PLASTIC SURGERY | Facility: CLINIC | Age: 54
End: 2019-12-30
Payer: COMMERCIAL

## 2019-12-30 PROCEDURE — 99024 POSTOP FOLLOW-UP VISIT: CPT

## 2019-12-31 ENCOUNTER — OUTPATIENT (OUTPATIENT)
Dept: OUTPATIENT SERVICES | Facility: HOSPITAL | Age: 54
LOS: 1 days | End: 2019-12-31
Payer: COMMERCIAL

## 2019-12-31 ENCOUNTER — APPOINTMENT (OUTPATIENT)
Dept: ULTRASOUND IMAGING | Facility: CLINIC | Age: 54
End: 2019-12-31
Payer: COMMERCIAL

## 2019-12-31 DIAGNOSIS — D36.10 BENIGN NEOPLASM OF PERIPHERAL NERVES AND AUTONOMIC NERVOUS SYSTEM, UNSPECIFIED: Chronic | ICD-10-CM

## 2019-12-31 DIAGNOSIS — Z98.890 OTHER SPECIFIED POSTPROCEDURAL STATES: Chronic | ICD-10-CM

## 2019-12-31 DIAGNOSIS — Z00.8 ENCOUNTER FOR OTHER GENERAL EXAMINATION: ICD-10-CM

## 2019-12-31 DIAGNOSIS — E07.9 DISORDER OF THYROID, UNSPECIFIED: Chronic | ICD-10-CM

## 2019-12-31 PROCEDURE — 76641 ULTRASOUND BREAST COMPLETE: CPT | Mod: 26,LT

## 2019-12-31 PROCEDURE — 76641 ULTRASOUND BREAST COMPLETE: CPT

## 2020-07-06 ENCOUNTER — APPOINTMENT (OUTPATIENT)
Dept: PLASTIC SURGERY | Facility: CLINIC | Age: 55
End: 2020-07-06
Payer: COMMERCIAL

## 2020-07-06 VITALS
OXYGEN SATURATION: 99 % | DIASTOLIC BLOOD PRESSURE: 83 MMHG | TEMPERATURE: 98.4 F | SYSTOLIC BLOOD PRESSURE: 131 MMHG | HEART RATE: 74 BPM

## 2020-07-06 PROCEDURE — 99024 POSTOP FOLLOW-UP VISIT: CPT

## 2020-08-12 ENCOUNTER — OUTPATIENT (OUTPATIENT)
Dept: OUTPATIENT SERVICES | Facility: HOSPITAL | Age: 55
LOS: 1 days | End: 2020-08-12
Payer: COMMERCIAL

## 2020-08-12 ENCOUNTER — APPOINTMENT (OUTPATIENT)
Dept: ULTRASOUND IMAGING | Facility: CLINIC | Age: 55
End: 2020-08-12
Payer: COMMERCIAL

## 2020-08-12 DIAGNOSIS — E07.9 DISORDER OF THYROID, UNSPECIFIED: Chronic | ICD-10-CM

## 2020-08-12 DIAGNOSIS — Z98.890 OTHER SPECIFIED POSTPROCEDURAL STATES: Chronic | ICD-10-CM

## 2020-08-12 DIAGNOSIS — Z00.8 ENCOUNTER FOR OTHER GENERAL EXAMINATION: ICD-10-CM

## 2020-08-12 DIAGNOSIS — D36.10 BENIGN NEOPLASM OF PERIPHERAL NERVES AND AUTONOMIC NERVOUS SYSTEM, UNSPECIFIED: Chronic | ICD-10-CM

## 2020-08-12 PROCEDURE — 76830 TRANSVAGINAL US NON-OB: CPT

## 2020-08-12 PROCEDURE — 76856 US EXAM PELVIC COMPLETE: CPT | Mod: 26

## 2020-08-12 PROCEDURE — 76856 US EXAM PELVIC COMPLETE: CPT

## 2020-08-12 PROCEDURE — 76830 TRANSVAGINAL US NON-OB: CPT | Mod: 26

## 2021-10-11 ENCOUNTER — APPOINTMENT (OUTPATIENT)
Dept: PLASTIC SURGERY | Facility: CLINIC | Age: 56
End: 2021-10-11
Payer: SELF-PAY

## 2021-10-11 DIAGNOSIS — Z90.13 ACQUIRED ABSENCE OF BILATERAL BREASTS AND NIPPLES: ICD-10-CM

## 2021-10-11 PROCEDURE — 99499Q: CUSTOM | Mod: NC

## 2021-10-12 PROBLEM — Z90.13 ABSENCE OF BOTH BREASTS: Status: ACTIVE | Noted: 2018-03-26

## 2022-01-28 NOTE — PATIENT PROFILE ADULT. - ARE SIGNIFICANT INDICATORS COMPLETE.
Yes Nasalis-Muscle-Based Myocutaneous Island Pedicle Flap Text: Using a #15 blade, an incision was made around the donor flap to the level of the nasalis muscle. Wide lateral undermining was then performed in both the subcutaneous plane above the nasalis muscle, and in a submuscular plane just above periosteum. This allowed the formation of a free nasalis muscle axial pedicle (based on the angular artery) which was still attached to the actual cutaneous flap, increasing its mobility and vascular viability. Hemostasis was obtained with pinpoint electrocoagulation. The flap was mobilized into position and the pivotal anchor points positioned and stabilized with buried interrupted sutures. Subcutaneous and dermal tissues were closed in a multilayered fashion with sutures. Tissue redundancies were excised, and the epidermal edges were apposed without significant tension and sutured with sutures.

## 2022-12-19 NOTE — ASU PREOP CHECKLIST - DNR CLARIFICATION FORM COMPLETED
"      Daija Dean is a 34 y.o. patient who presents for follow up of   Chief Complaint   Patient presents with   • Vaginal Pain   • Exposure to STD     screen     35 yo est pt here for emergency appt for vaginal itching and burning at the introitus. No lesions. She has had a new partner and wants an STD screen done. She did take clindamycin for presumed BV and her symptoms improved somewhat but are still present. She has seen [facility] and plans on freezing her eggs in the next few months.         The following portions of the patient's history were reviewed and updated as appropriate: allergies, current medications and problem list.    Review of Systems   Constitutional: Positive for activity change.   Genitourinary: Positive for vaginal discharge and vaginal pain. Negative for frequency, genital sores and pelvic pain.       /60   Ht 170.2 cm (67\")   Wt 68 kg (150 lb)   LMP  (LMP Unknown)   Breastfeeding No   BMI 23.49 kg/m²     Physical Exam  Vitals and nursing note reviewed. Exam conducted with a chaperone present.   Constitutional:       Appearance: Normal appearance. She is well-developed.   HENT:      Head: Normocephalic and atraumatic.   Eyes:      General: No scleral icterus.     Conjunctiva/sclera: Conjunctivae normal.   Neck:      Thyroid: No thyromegaly.   Abdominal:      General: There is no distension.      Palpations: There is no mass.      Tenderness: There is no abdominal tenderness. There is no guarding or rebound.      Hernia: No hernia is present.   Genitourinary:     General: Normal vulva.      Vagina: Vaginal discharge present.      Cervix: Normal.      Uterus: Normal.       Adnexa: Right adnexa normal and left adnexa normal.      Comments: IUD string not seen  Skin:     General: Skin is warm and dry.   Neurological:      Mental Status: She is alert and oriented to person, place, and time.   Psychiatric:         Mood and Affect: Mood normal.         Behavior: Behavior " normal.         Thought Content: Thought content normal.         Judgment: Judgment normal.         A/P:  1. Vaginal discomfort- check NuSwab Y/M/L/B  2. Check STD panel per pt request.   3. RHM- UTD annual 6/2022.   4. AMA- plans egg cryopreservation.   5. RTO 6/2023 annual and/or prn.     Assessment & Plan   Diagnoses and all orders for this visit:    1. Screen for STD (sexually transmitted disease) (Primary)  -     Hepatitis B Surface Antigen  -     Hepatitis C Antibody  -     HIV-1 / O / 2 Ag / Antibody 4th Generation  -     HSV 1 & 2 - Specific Antibody, IgG  -     RPR, Rfx Qn RPR / Confirm TP  -     NuSwab VG+ - Swab, Vagina  -     Genital Mycoplasmas KARMA, Swab - Swab, Vagina    2. Screening for genitourinary condition  -     POC Urinalysis Dipstick  -     POC Pregnancy, Urine    3. Vaginal discomfort                 No follow-ups on file.      Mariela Royal MD    12/19/2022  15:36 EST   n/a

## 2023-05-03 NOTE — H&P PST ADULT - NS PRO FEM  PAP SMEARS 3YRS
Detail Level: Detailed Depth Of Biopsy: dermis Was A Bandage Applied: Yes Size Of Lesion In Cm: 0 Biopsy Type: H and E Biopsy Method: Dermablade Anesthesia Type: 1% lidocaine with epinephrine Anesthesia Volume In Cc: 0.5 Hemostasis: Drysol Wound Care: Petrolatum Dressing: bandage Destruction After The Procedure: No Type Of Destruction Used: Curettage Curettage Text: The wound bed was treated with curettage after the biopsy was performed. Cryotherapy Text: The wound bed was treated with cryotherapy after the biopsy was performed. Electrodesiccation Text: The wound bed was treated with electrodesiccation after the biopsy was performed. Electrodesiccation And Curettage Text: The wound bed was treated with electrodesiccation and curettage after the biopsy was performed. Silver Nitrate Text: The wound bed was treated with silver nitrate after the biopsy was performed. Lab: [id number] Lab Facility: [id number] Consent: Written consent was obtained and risks were reviewed including but not limited to scarring, infection, bleeding, scabbing, incomplete removal, nerve damage and allergy to anesthesia. Post-Care Instructions: I reviewed with the patient in detail post-care instructions. Patient is to keep the biopsy site dry overnight, and then apply bacitracin twice daily until healed. Patient may apply hydrogen peroxide soaks to remove any crusting. Notification Instructions: Patient will be notified of biopsy results. However, patient instructed to call the office if not contacted within 2 weeks. Billing Type: Third-Party Bill Information: Selecting Yes will display possible errors in your note based on the variables you have selected. This validation is only offered as a suggestion for you. PLEASE NOTE THAT THE VALIDATION TEXT WILL BE REMOVED WHEN YOU FINALIZE YOUR NOTE. IF YOU WANT TO FAX A PRELIMINARY NOTE YOU WILL NEED TO TOGGLE THIS TO 'NO' IF YOU DO NOT WANT IT IN YOUR FAXED NOTE. 04/2018/yes

## 2024-12-02 NOTE — BRIEF OPERATIVE NOTE - OPERATION/FINDINGS
Revision of right breast reconstruction and abdominal donor site with liposuction. Show Aperture Variable?: Yes Render Post-Care Instructions In Note?: no Number Of Freeze-Thaw Cycles: 2 freeze-thaw cycles Duration Of Freeze Thaw-Cycle (Seconds): 3 Post-Care Instructions: I reviewed with the patient in detail post-care instructions. Patient is to wear sunprotection, and avoid picking at any of the treated lesions. Pt may apply Vaseline to crusted or scabbing areas. Detail Level: Detailed Consent: The patient's consent was obtained including but not limited to risks of crusting, scabbing, blistering, scarring, darker or lighter pigmentary change, recurrence, incomplete removal and infection.

## 2025-04-15 NOTE — PRE-ANESTHESIA EVALUATION ADULT - TEMPERATURE IN CELSIUS (DEGREES C)
Duplicate   
PM Assessment and Mediations completed. BP (!) 141/71   Pulse 88   Temp 100.2 °F (37.9 °C) (Axillary)   Resp 20   Ht 5' 3\" (1.6 m)   Wt (!) 321 lb 6.9 oz (145.8 kg)   SpO2 90%   BMI 56.94 kg/m²     Alert and oriented x4. Denies pain. Currently sitting up in the chair. Oxygen @ 2L in place. Calls appropriately. Plan of care and safety measures reviewed with the patient. Needed items including call light in reach and exit alarm in place.        Electronically signed by Kenneth Sesay RN on 6/9/2022 at 9:35 PM
Patient called the RX Refill Line. Message is being forwarded to the office.     Patient is requesting to have the refill for Mounjaro 7.5m/0.5ml to be called in to [facility]. Patient states her insurance will not cover tirzepatide (Zepbound) 7.5 mg/0.5 mL . They will cover the Mounjaro. Patient is out of medication and needs refilled for Fridays dose.      Please contact patient at [phone]      Pharmacy: [id number]    
36.5